# Patient Record
Sex: MALE | Race: WHITE
[De-identification: names, ages, dates, MRNs, and addresses within clinical notes are randomized per-mention and may not be internally consistent; named-entity substitution may affect disease eponyms.]

---

## 2017-05-07 ENCOUNTER — HOSPITAL ENCOUNTER (INPATIENT)
Dept: HOSPITAL 53 - M PCU | Age: 57
LOS: 4 days | Discharge: NURSING, REHAB OR LONG-TERM CARE | DRG: 45 | End: 2017-05-11
Attending: INTERNAL MEDICINE | Admitting: INTERNAL MEDICINE
Payer: SELF-PAY

## 2017-05-07 VITALS — SYSTOLIC BLOOD PRESSURE: 206 MMHG | DIASTOLIC BLOOD PRESSURE: 108 MMHG

## 2017-05-07 VITALS — SYSTOLIC BLOOD PRESSURE: 184 MMHG | DIASTOLIC BLOOD PRESSURE: 98 MMHG

## 2017-05-07 VITALS — BODY MASS INDEX: 29.57 KG/M2 | HEIGHT: 74 IN | WEIGHT: 230.38 LBS

## 2017-05-07 DIAGNOSIS — F17.210: ICD-10-CM

## 2017-05-07 DIAGNOSIS — G89.29: ICD-10-CM

## 2017-05-07 DIAGNOSIS — J30.9: ICD-10-CM

## 2017-05-07 DIAGNOSIS — Z79.891: ICD-10-CM

## 2017-05-07 DIAGNOSIS — E11.9: ICD-10-CM

## 2017-05-07 DIAGNOSIS — Z79.899: ICD-10-CM

## 2017-05-07 DIAGNOSIS — I63.211: Primary | ICD-10-CM

## 2017-05-07 DIAGNOSIS — I10: ICD-10-CM

## 2017-05-07 DIAGNOSIS — E87.6: ICD-10-CM

## 2017-05-07 DIAGNOSIS — G81.94: ICD-10-CM

## 2017-05-07 LAB
ANION GAP SERPL CALC-SCNC: 7 MEQ/L (ref 8–16)
BUN SERPL-MCNC: 9 MG/DL (ref 7–18)
CALCIUM SERPL-MCNC: 8.6 MG/DL (ref 8.5–10.1)
CHLORIDE SERPL-SCNC: 103 MEQ/L (ref 98–107)
CHOLEST SERPL-MCNC: 159 MG/DL (ref ?–200)
CO2 SERPL-SCNC: 27 MEQ/L (ref 21–32)
CREAT SERPL-MCNC: 0.93 MG/DL (ref 0.7–1.3)
ERYTHROCYTE [DISTWIDTH] IN BLOOD BY AUTOMATED COUNT: 13.6 % (ref 11.5–14.5)
EST. AVERAGE GLUCOSE BLD GHB EST-MCNC: 255 MG/DL (ref 60–110)
GFR SERPL CREATININE-BSD FRML MDRD: > 60 ML/MIN/{1.73_M2} (ref 56–?)
GLUCOSE SERPL-MCNC: 262 MG/DL (ref 70–105)
MCH RBC QN AUTO: 31.2 PG (ref 27–33)
MCHC RBC AUTO-ENTMCNC: 34.4 G/DL (ref 32–36.5)
MCV RBC AUTO: 90.7 FL (ref 80–96)
PLATELET # BLD AUTO: 128 K/MM3 (ref 150–450)
POTASSIUM SERPL-SCNC: 3.7 MEQ/L (ref 3.5–5.1)
SODIUM SERPL-SCNC: 137 MEQ/L (ref 136–145)
TRIGL SERPL-MCNC: 192 MG/DL (ref ?–150)
WBC # BLD AUTO: 8.9 K/MM3 (ref 4–10)

## 2017-05-07 RX ADMIN — ATORVASTATIN CALCIUM SCH MG: 20 TABLET, FILM COATED ORAL at 21:00

## 2017-05-07 RX ADMIN — DOCUSATE SODIUM,SENNOSIDES SCH TAB: 50; 8.6 TABLET, FILM COATED ORAL at 21:00

## 2017-05-07 RX ADMIN — HYDRALAZINE HYDROCHLORIDE SCH MG: 20 INJECTION INTRAMUSCULAR; INTRAVENOUS at 22:05

## 2017-05-07 RX ADMIN — INSULIN LISPRO SCH UNITS: 100 INJECTION, SOLUTION INTRAVENOUS; SUBCUTANEOUS at 21:00

## 2017-05-07 NOTE — REPUSA
MRI of the brain

Clinical history: left-sided numbness.

Technique: Multiecho multiplanar MRI images of the brain were obtained without administration of cont
rast. Diffusion weighted images with ADC mapping was also obtained.

Findings:

The ventricles and sulci are symmetric bilaterally. There is a focal area  of T2 hyperintensity demon
strated the posterior right haider. This site demonstrates restricted diffusion, consistent with an acu
te infarct. Mild edema is seen in this region on T2 weighted images. There is no midline shift, mass 
effect, or extra-axial fluid collection. The cervical cranial junction is intact. The orbits are unre
markable. The visualized paranasal sinuses and mastoid air cells are clear. The osseous structures an
d superficial soft tissues are unremarkable. The vascular structures demonstrate appropriate flow voi
ds.

Impression: Acute infarct in the posterior right haider.

Milli, the floor nurse, was notified of these findings at 11:42 PM on 5/7/2017. The physician is 
to be notified.

     Electronically signed by AUGUST DAVIS MD on 05/07/2017 11:43:00 PM ET

## 2017-05-07 NOTE — REPUSA
MRA of the brain

Clinical history: Left sided numbness.

Technique: Time-of-flight MRA images of the brain were obtained without administration of contrast. 3
-D MIP images were also obtained.

Findings: There is focal thrombosis of the midportion of the basilar artery. There is also no signifi
cant flow demonstrated in the right superior cerebellar artery, although flow is seen in the left cer
ebellar artery. Reconstitution of blood flow is seen just proximal to the  The vascular structures ex
tending from the distal carotid  through the [place] demonstrate normal caliber and contour.
 There is no evidence of aneurysm.

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 
in the right superior cerebellar artery.

     Electronically signed by AUGUST DAVIS MD on 05/07/2017 11:35:59 PM ET

## 2017-05-08 VITALS — DIASTOLIC BLOOD PRESSURE: 90 MMHG | SYSTOLIC BLOOD PRESSURE: 175 MMHG

## 2017-05-08 VITALS — DIASTOLIC BLOOD PRESSURE: 98 MMHG | SYSTOLIC BLOOD PRESSURE: 186 MMHG

## 2017-05-08 VITALS — DIASTOLIC BLOOD PRESSURE: 95 MMHG | SYSTOLIC BLOOD PRESSURE: 173 MMHG

## 2017-05-08 VITALS — DIASTOLIC BLOOD PRESSURE: 85 MMHG | SYSTOLIC BLOOD PRESSURE: 178 MMHG

## 2017-05-08 VITALS — SYSTOLIC BLOOD PRESSURE: 174 MMHG | DIASTOLIC BLOOD PRESSURE: 82 MMHG

## 2017-05-08 VITALS — SYSTOLIC BLOOD PRESSURE: 160 MMHG | DIASTOLIC BLOOD PRESSURE: 88 MMHG

## 2017-05-08 VITALS — SYSTOLIC BLOOD PRESSURE: 166 MMHG | DIASTOLIC BLOOD PRESSURE: 84 MMHG

## 2017-05-08 LAB
ANION GAP SERPL CALC-SCNC: 9 MEQ/L (ref 8–16)
BUN SERPL-MCNC: 10 MG/DL (ref 7–18)
CALCIUM SERPL-MCNC: 8.4 MG/DL (ref 8.5–10.1)
CHLORIDE SERPL-SCNC: 104 MEQ/L (ref 98–107)
CO2 SERPL-SCNC: 24 MEQ/L (ref 21–32)
CREAT SERPL-MCNC: 0.81 MG/DL (ref 0.7–1.3)
ERYTHROCYTE [DISTWIDTH] IN BLOOD BY AUTOMATED COUNT: 13.6 % (ref 11.5–14.5)
GFR SERPL CREATININE-BSD FRML MDRD: > 60 ML/MIN/{1.73_M2} (ref 56–?)
GLUCOSE SERPL-MCNC: 220 MG/DL (ref 70–105)
MCH RBC QN AUTO: 31.8 PG (ref 27–33)
MCHC RBC AUTO-ENTMCNC: 35.5 G/DL (ref 32–36.5)
MCV RBC AUTO: 89.5 FL (ref 80–96)
PLATELET # BLD AUTO: 118 K/MM3 (ref 150–450)
POTASSIUM SERPL-SCNC: 3.6 MEQ/L (ref 3.5–5.1)
SODIUM SERPL-SCNC: 137 MEQ/L (ref 136–145)
WBC # BLD AUTO: 9.7 K/MM3 (ref 4–10)

## 2017-05-08 RX ADMIN — INSULIN LISPRO SCH UNITS: 100 INJECTION, SOLUTION INTRAVENOUS; SUBCUTANEOUS at 21:00

## 2017-05-08 RX ADMIN — SODIUM CHLORIDE, PRESERVATIVE FREE SCH ML: 5 INJECTION INTRAVENOUS at 14:00

## 2017-05-08 RX ADMIN — INSULIN LISPRO SCH UNITS: 100 INJECTION, SOLUTION INTRAVENOUS; SUBCUTANEOUS at 09:00

## 2017-05-08 RX ADMIN — INSULIN LISPRO SCH UNITS: 100 INJECTION, SOLUTION INTRAVENOUS; SUBCUTANEOUS at 12:27

## 2017-05-08 RX ADMIN — ENOXAPARIN SODIUM SCH MG: 40 INJECTION SUBCUTANEOUS at 08:59

## 2017-05-08 RX ADMIN — DOCUSATE SODIUM,SENNOSIDES SCH TAB: 50; 8.6 TABLET, FILM COATED ORAL at 21:10

## 2017-05-08 RX ADMIN — HYDRALAZINE HYDROCHLORIDE SCH MG: 20 INJECTION INTRAMUSCULAR; INTRAVENOUS at 02:55

## 2017-05-08 RX ADMIN — HYDRALAZINE HYDROCHLORIDE SCH MG: 20 INJECTION INTRAMUSCULAR; INTRAVENOUS at 21:10

## 2017-05-08 RX ADMIN — HYDRALAZINE HYDROCHLORIDE SCH MG: 20 INJECTION INTRAMUSCULAR; INTRAVENOUS at 08:00

## 2017-05-08 RX ADMIN — NICOTINE SCH PATCH: 21 PATCH, EXTENDED RELEASE TRANSDERMAL at 10:14

## 2017-05-08 RX ADMIN — SODIUM CHLORIDE, PRESERVATIVE FREE SCH ML: 5 INJECTION INTRAVENOUS at 21:13

## 2017-05-08 RX ADMIN — ATORVASTATIN CALCIUM SCH MG: 20 TABLET, FILM COATED ORAL at 21:08

## 2017-05-08 RX ADMIN — DOCUSATE SODIUM,SENNOSIDES SCH TAB: 50; 8.6 TABLET, FILM COATED ORAL at 09:00

## 2017-05-08 RX ADMIN — INSULIN LISPRO SCH UNITS: 100 INJECTION, SOLUTION INTRAVENOUS; SUBCUTANEOUS at 17:34

## 2017-05-08 RX ADMIN — HYDRALAZINE HYDROCHLORIDE SCH MG: 20 INJECTION INTRAMUSCULAR; INTRAVENOUS at 14:00

## 2017-05-08 NOTE — REP
Clinical:  Acute stroke.

 

Technique:  Gray scale and color Doppler evaluation using linear high frequency

transducer

 

Findings:

Two-dimensional gray scale and color images demonstrate intimal thickening to the

bilateral common carotid arteries extending to the carotid bulbs where small

amounts of mixed plaque material is identified causing minimal luminal narrowing.

Normal laminar flow is appreciated without turbulence or significant stenosis.

Color Doppler interrogation demonstrates normal arterial wave patterns and

velocities with no significant spectral broadening.  Normal flow direction is

appreciated in the bilateral vertebral arteries.

 

                                              RIGHT (cm/s)         LEFT (cm/s)

 

ICA peak systolic velocity            90.7                          996.8

ICA diastolic velocity                  37.0                          34.5

ECA peak systolic velocity           162.6                        153.9

CCA peak systolic velocity           122.2                        145.8

ICA/CCA ratio                           0.74                          0.66

 

Impression:

No hemodynamically significant areas of narrowing or stenosis appreciated.

Based on set standards narrowing falls within the less than 50% range.

 

 

Signed by

Beni Reed MD 05/08/2017 03:39 A

## 2017-05-08 NOTE — REP
MR ANGIOGRAPHY OF THE CAROTIDS WITHOUT AND WITH IV GADOLINIUM:

 

HISTORY:  Acute stroke.

 

Comparison is made with the previous day's MRI study would interpreted showing a

right  posterior pontine infarction.

 

TECHNIQUE:  2D pre-gadolinium-enhanced and 3D post-gadolinium enhanced MR

angiography is acquired in the usual fashion.  Maximal intensity projection

images are generated and displayed rotationally.  Source axial and coronal images

are reviewed.  The gadolinium enhancement dose is 25 ml of intravenous ProHance.

 

MR angiographic findings:  Great vessel origins from the thoracic aorta are

unremarkable.  The vertebral arteries are patent and codominant.  There is

however a high-grade stenosis versus short segment occlusion of the basilar

artery.  The right distal vertebral artery is hypoplastic.

 

The common carotid arteries are unremarkable.  No evidence of carotid artery

stenosis is seen on either side.  The internal carotid arteries are unremarkable

bilaterally.

 

IMPRESSION: High-grade focal stenosis versus short segment occlusion of the

basilar artery.

 

 

Signed by

Major Mancia MD 05/08/2017 03:43 P

## 2017-05-08 NOTE — HPE
DATE OF ADMISSION:  05/07/2017

 

PRIMARY CARE PROVIDER:  SCOTT Avilez.

 

CHIEF COMPLAINT:  Patient presented to [facility] with left-sided

tingling, numbness, weakness and lightness for 1 day.

 

PAST MEDICAL HISTORY:  Hypertension, has not taken any medications for at least

10 years.

 

HISTORY OF PRESENT ILLNESS:  This is a 56-year-old male, has not seen any

physician except orthopedic surgeons for the past 10 years and has not had any

blood work done that he can remember for the past 10 years, who has been feeling

sick for 1 week.  He felt off balance, which happened intermittently throughout

the week.  He also had blurring of vision intermittently during the week, then on

one day last week he checked his blood pressure, which was over 200; however, he

rechecked it later, which came down back to normal.  He was also feeling some

abnormal sensations over his hands and legs today.  The tingling and numbness was

all throughout the left side of the body including the face, the upper

extremities and the lower extremities.  He felt his hands and his legs were

lighter and numb and he felt that he was not able to make his hand do his

commands properly.  He thought that he was having a stroke.

 

In the emergency department (ED), on initial arrival, his blood pressure was

noted to be 216/139, pulse was 110, respiratory rate 16, temperature 98.5, pulse

oximetry 99% in room air.  He had a CT scan of the head dome at [facility], which did not show any acute stroke; however, on physical exam, it was

felt his left side was weaker and there was drift on the left upper extremity, so

the concern for stroke remained high, so our hospital was called for transfer as

they did not have MRA capabilities, so the patient was transferred here for

evaluation for stroke.

 

On admission here, initially his blood pressure was 210.  Patient was ordered MRI

and MRA of the brain.  Patient's blood work from [place] was significant for

hemoglobin of 18.4, hematocrit of 50, platelets of 132.  His sodium was 129,

blood sugar was 326.  Liver function tests were normal.  Renal function tests

were normal.  Coagulation studies were normal.  Patient did say that he took

someone else's ramipril on Thursday, Friday and Saturday after he noted his blood

pressure to be high on Wednesday and after taking the ramipril his blood pressure

had improved.  He denied any fever or chills.  Denied any chest pain, any

shortness of breath or cough.  Denied any abdominal pain, nausea, vomiting or

diarrhea.  He continues to have persistent abnormal sensations on the left side

of the body and weakness.

 

PAST SURGICAL HISTORY:  Right knee surgery and left knee surgery about 10 years

ago.

SOCIAL HISTORY: Patient smokes about a pack per day.  Occasionally drinks

alcohol.  Does not abuse any recreational drugs.

 

ALLERGIES:  No known drug allergies.

 

HOME MEDICATIONS:  Does not take any home medications.

 

REVIEW OF SYSTEMS:  All 10-point review of systems was negative except those

mentioned in history of present illness (HPI).

 

PHYSICAL EXAMINATION:

VITAL SIGNS:  Temperature 97.5, pulse 94, respiratory rate 20, blood pressure

206/108, pulse oximetry 94% in room air.

GENERAL:  Patient awake, alert, oriented times three, lying down in bed in no

acute distress.

HEENT:  Normocephalic, atraumatic.  Moist mucous membranes.  Anicteric eyes.

CHEST:  Clear to auscultation.

CARDIOVASCULAR:  S1, S2 regular.  No rub, murmur or gallop.

ABDOMEN:  Soft, nontender, bowel sounds present.

EXTREMITIES:  No edema.

NEUROLOGICAL:  There is 4/5 power in the upper extremity and 4/5 power in the

lower extremity.  There is pronator drift of the upper extremity.  Babinski is

equivocal.  Deep tendon reflexes could not be elicited on either lower extremity.

 

 

LABORATORY DATA:  WBC 8.9, hemoglobin 18, platelets 128.

 

Sodium 137, potassium 3.7, chloride 103, bicarbonate 27, BUN 9, creatinine 0.9,

glucose 262, calcium 8.6.

 

ASSESSMENT AND PLAN:  This is a 56-year-old male admitted for possible stroke.

 

PLAN:

1.  For possible stroke, will get MRI and MRA of the brain without contrast.

Will also get carotid ultrasound bilaterally.  Patient already received aspirin

and statin at [facility].  Will continue that from tomorrow.  Will allow

for permissive hypertension and will not give any antihypertensive medications

unless systolic blood pressure is over 180.

 

2.  Diabetes.  Patient has two tests of over 200 random values, so that qualifies

the patient as diabetic.  Will place the patient on sliding-scale insulin at

present and will later start the patient on oral hypoglycemic medications.

 

3.  Hypertension.  Patient did have some blood pressure readings in that

hospital, as well as initial blood pressure reading in our hospital over 200.

Will place the patient on as needed hydralazine 10 mg intravenously (IV) if

systolic blood pressure is greater than 180.

 

4.  Deep venous thrombosis (DVT) prophylaxis has been ordered.

## 2017-05-08 NOTE — IPNPDOC
Text Note


Date of Service


The patient was seen on 5/8/17.





NOTE


Spoke with Dr Mcdonald in [facility] for possible transfer for thrombectomy . 

However as his NIHSS stroke score is only 4 he is not a candidate for any 

endovascular intervention. It is recommended only is the score is 6 or greater. 

Spoke with Dr Dunn he will be seeing the patient. Will continue with 

statin , asa, maintain sbp between 160 to 180 , neuro checks.





VS,Fishbone, I+O


VS, Fishbone, I+O


Laboratory Tests


5/7/17 21:09








Red Blood Count 5.78, Mean Corpuscular Volume 90.7, Mean Corpuscular Hemoglobin 

31.2, Mean Corpuscular Hemoglobin Concent 34.4, Red Cell Distribution Width 13.6

, Calcium Level 8.6








Vital Signs








  Date Time  Temp Pulse Resp B/P (MAP) Pulse Ox O2 Delivery O2 Flow Rate FiO2


 


5/7/17 23:59 99.6 95 20 184/98 (126) 95 Room Air  














I&O- Last 24 Hours up to 6 AM 


 


 5/8/17





 06:00


 


Intake Total 0 ml


 


Balance 0 ml

















DANY DOOLEY MD May 8, 2017 01:18

## 2017-05-09 VITALS — DIASTOLIC BLOOD PRESSURE: 93 MMHG | SYSTOLIC BLOOD PRESSURE: 183 MMHG

## 2017-05-09 VITALS — DIASTOLIC BLOOD PRESSURE: 96 MMHG | SYSTOLIC BLOOD PRESSURE: 175 MMHG

## 2017-05-09 VITALS — DIASTOLIC BLOOD PRESSURE: 92 MMHG | SYSTOLIC BLOOD PRESSURE: 188 MMHG

## 2017-05-09 VITALS — DIASTOLIC BLOOD PRESSURE: 92 MMHG | SYSTOLIC BLOOD PRESSURE: 174 MMHG

## 2017-05-09 VITALS — DIASTOLIC BLOOD PRESSURE: 82 MMHG | SYSTOLIC BLOOD PRESSURE: 154 MMHG

## 2017-05-09 VITALS — SYSTOLIC BLOOD PRESSURE: 179 MMHG | DIASTOLIC BLOOD PRESSURE: 104 MMHG

## 2017-05-09 VITALS — SYSTOLIC BLOOD PRESSURE: 168 MMHG | DIASTOLIC BLOOD PRESSURE: 86 MMHG

## 2017-05-09 VITALS — DIASTOLIC BLOOD PRESSURE: 92 MMHG | SYSTOLIC BLOOD PRESSURE: 162 MMHG

## 2017-05-09 VITALS — DIASTOLIC BLOOD PRESSURE: 94 MMHG | SYSTOLIC BLOOD PRESSURE: 162 MMHG

## 2017-05-09 LAB
ANION GAP SERPL CALC-SCNC: 11 MEQ/L (ref 8–16)
BASOPHILS # BLD AUTO: 0.1 K/MM3 (ref 0–0.2)
BASOPHILS NFR BLD AUTO: 1 % (ref 0–1)
BUN SERPL-MCNC: 10 MG/DL (ref 7–18)
CALCIUM SERPL-MCNC: 8.2 MG/DL (ref 8.5–10.1)
CHLORIDE SERPL-SCNC: 105 MEQ/L (ref 98–107)
CO2 SERPL-SCNC: 23 MEQ/L (ref 21–32)
CREAT SERPL-MCNC: 0.76 MG/DL (ref 0.7–1.3)
EOSINOPHIL # BLD AUTO: 0.2 K/MM3 (ref 0–0.5)
EOSINOPHIL NFR BLD AUTO: 1.9 % (ref 0–3)
ERYTHROCYTE [DISTWIDTH] IN BLOOD BY AUTOMATED COUNT: 13.7 % (ref 11.5–14.5)
GFR SERPL CREATININE-BSD FRML MDRD: > 60 ML/MIN/{1.73_M2} (ref 56–?)
GLUCOSE SERPL-MCNC: 196 MG/DL (ref 70–105)
LARGE UNSTAINED CELL #: 0.2 K/MM3 (ref 0–0.4)
LARGE UNSTAINED CELL %: 1.5 % (ref 0–4)
LYMPHOCYTES # BLD AUTO: 1.9 K/MM3 (ref 1.5–4.5)
LYMPHOCYTES NFR BLD AUTO: 17.4 % (ref 24–44)
MCH RBC QN AUTO: 30.6 PG (ref 27–33)
MCHC RBC AUTO-ENTMCNC: 34.8 G/DL (ref 32–36.5)
MCV RBC AUTO: 87.8 FL (ref 80–96)
MONOCYTES # BLD AUTO: 0.6 K/MM3 (ref 0–0.8)
MONOCYTES NFR BLD AUTO: 5.7 % (ref 0–5)
NEUTROPHILS # BLD AUTO: 7.1 K/MM3 (ref 1.8–7.7)
NEUTROPHILS NFR BLD AUTO: 72.5 % (ref 36–66)
PLATELET # BLD AUTO: 106 K/MM3 (ref 150–450)
POTASSIUM SERPL-SCNC: 3.3 MEQ/L (ref 3.5–5.1)
SODIUM SERPL-SCNC: 139 MEQ/L (ref 136–145)
WBC # BLD AUTO: 9.8 K/MM3 (ref 4–10)

## 2017-05-09 RX ADMIN — INSULIN LISPRO SCH UNITS: 100 INJECTION, SOLUTION INTRAVENOUS; SUBCUTANEOUS at 11:51

## 2017-05-09 RX ADMIN — INSULIN LISPRO SCH UNITS: 100 INJECTION, SOLUTION INTRAVENOUS; SUBCUTANEOUS at 21:00

## 2017-05-09 RX ADMIN — ATORVASTATIN CALCIUM SCH MG: 20 TABLET, FILM COATED ORAL at 20:28

## 2017-05-09 RX ADMIN — HYDRALAZINE HYDROCHLORIDE SCH MG: 20 INJECTION INTRAMUSCULAR; INTRAVENOUS at 08:00

## 2017-05-09 RX ADMIN — SODIUM CHLORIDE, PRESERVATIVE FREE SCH ML: 5 INJECTION INTRAVENOUS at 13:50

## 2017-05-09 RX ADMIN — DOCUSATE SODIUM,SENNOSIDES SCH TAB: 50; 8.6 TABLET, FILM COATED ORAL at 20:28

## 2017-05-09 RX ADMIN — NICOTINE SCH PATCH: 21 PATCH, EXTENDED RELEASE TRANSDERMAL at 08:36

## 2017-05-09 RX ADMIN — HYDRALAZINE HYDROCHLORIDE SCH MG: 20 INJECTION INTRAMUSCULAR; INTRAVENOUS at 02:17

## 2017-05-09 RX ADMIN — HYDRALAZINE HYDROCHLORIDE SCH MG: 20 INJECTION INTRAMUSCULAR; INTRAVENOUS at 20:28

## 2017-05-09 RX ADMIN — POTASSIUM CHLORIDE SCH MEQ: 750 TABLET, EXTENDED RELEASE ORAL at 11:51

## 2017-05-09 RX ADMIN — SODIUM CHLORIDE, PRESERVATIVE FREE SCH ML: 5 INJECTION INTRAVENOUS at 21:14

## 2017-05-09 RX ADMIN — HYDRALAZINE HYDROCHLORIDE SCH MG: 20 INJECTION INTRAMUSCULAR; INTRAVENOUS at 13:50

## 2017-05-09 RX ADMIN — ENOXAPARIN SODIUM SCH MG: 40 INJECTION SUBCUTANEOUS at 08:34

## 2017-05-09 RX ADMIN — POTASSIUM CHLORIDE SCH MEQ: 750 TABLET, EXTENDED RELEASE ORAL at 08:35

## 2017-05-09 RX ADMIN — INSULIN LISPRO SCH UNITS: 100 INJECTION, SOLUTION INTRAVENOUS; SUBCUTANEOUS at 08:35

## 2017-05-09 RX ADMIN — SODIUM CHLORIDE, PRESERVATIVE FREE SCH ML: 5 INJECTION INTRAVENOUS at 05:29

## 2017-05-09 RX ADMIN — DOCUSATE SODIUM,SENNOSIDES SCH TAB: 50; 8.6 TABLET, FILM COATED ORAL at 08:35

## 2017-05-09 RX ADMIN — ASPIRIN SCH MG: 81 TABLET ORAL at 08:36

## 2017-05-09 RX ADMIN — INSULIN LISPRO SCH UNITS: 100 INJECTION, SOLUTION INTRAVENOUS; SUBCUTANEOUS at 17:32

## 2017-05-09 NOTE — REPUSA
CLINICAL HISTORY: Headache.

TECHNIQUE: Multiple axial CT images were obtained through the brain without IV contrast material.

COMMENTS: 

There is normal configuration of sella turcica. There are no intra or extra-axial collections. There 
is no mass effect or midline shift. There is no evidence of hematoma formation. No hydrocephalus is p
resent. The ventricles are symmetrical. No abnormal calcifications are present. 

There are bilateral periventricular and subcortical white matter hypolucencies compatible with mild c
hronic microvascular disease.

Otherwise, no significant focal abnormalities are seen either in the posterior fossa or supratentoria
l compartment.

IMPRESSION:

1.  Mild chronic microvascular disease.

2. No evidence of acute intracranial pathology. 

Thank you for your kind referral of this patient.

 

     Electronically signed by JENNIFER NINO MD on 05/09/2017 06:17:59 AM ET

## 2017-05-09 NOTE — CR
DATE OF CONSULTATION:  05/09/2017

 

REASON FOR CONSULTATION:  Suspect stroke.

 

HISTORY OF PRESENT ILLNESS:  Ramnó Rosales is a 56-year-old male with a past

medical history significant for untreated diabetes, tobacco abuse presenting with

a chief complaint of experiencing intermittent symptoms of dizziness, off balance

feeling, blurring of vision intermittently for the week.  He suddenly developed

left upper and lower extremity paresthesias and tingling which persisted.  He was

seen at [facility] and he was thought to have possible stroke.  Head CT

was negative.  The patient was later transferred to [facility] for

further evaluation.  By then, the patient is out of the TPA window.  The patient

was found to have evidence of a right posterior pontine infarct, as well as

evidence of a vascular severe stenosis versus occlusion with right atrial artery

occlusion.  The patient was started on aspirin 81 mg daily.  This was switched

over to aspirin 81 mg and Plavix 75 mg daily.  The patient states that he is

ready to quit tobacco.  He states that he continues to have paresthesias of his

left arm and leg and trunk.  He has weakness and clumsiness of his left arm and

of his left leg.  He has a positive pronator drift upon assessment.  He denies

any headache.  He denies any change in vision or speech.  He denies any

dysarthria.

 

PAST MEDICAL HISTORY:  Tobacco abuse, untreated diabetes, new diagnosis for the

patient.

 

PAST SURGICAL HISTORY:  Right knee surgery and left knee surgery about 10 years

ago.

 

SOCIAL HISTORY:  The patient smokes one to two packs of tobacco per day and

drinks alcohol recreationally.  Denies use of any recreational drugs.

 

ALLERGIES:  None.

 

MEDICATIONS:  None.

 

REVIEW OF SYSTEMS:  14-point review of systems obtained and is negative except as

per history of present illness.

 

PHYSICAL EXAMINATION

Blood pressure is 206/108 on admission, pulse rate 94, respiratory rate 20,

oxygenation 94% on room air.  The patient is alert, oriented to person, place and

time.  Speech language, ____________________ repetition are intact.  Pupils are 3

mm round, reactive to light.  No nystagmus is noted.  V1, V2-V3 is intact to

light touch.  Hearing is subjectively equal to finger rub and tongue is midline.

Palate elevates symmetrically.  No weakness sternocleidomastoids bilaterally.

Motor examination:  There is positive pronator drift of the left upper extremity.

Deltoid is a 5-/5, otherwise 5/5 throughout.  The lower extremity iliopsoas on

the left is a 4+, otherwise 5/5 throughout.  Deep tendon reflexes are slightly

increased in the left upper extremity compared to the right.  Deep tendon

reflexes are absent at the patellas and Achilles.  Babinski signs are mute.

Sensory is in decreased to light touch in the left upper and lower extremity.

Coordination reveals mild dysmetria with finger-to-nose of the left upper

extremity.  Gait deferred.

 

ASSESSMENT:

1.  Posterior right pontine acute infarct with severe stenosis of deep vessel

artery versus occlusion, as well as occlusion of the right vertebral artery under

the setting of tobacco abuse and undiagnosed diabetes.

 

PLAN

1.  Optimize diabetes.

2.  Continue aspirin 81 mg, Plavix 75 mg.

3.  Continue Lipitor 40 mg.

4.  Continue telemetry monitoring.  Followup echocardiogram.

5.  Physical therapy (PT) and occupational therapy (OT) evaluation.

6.  Keep systolic blood pressure 140 to 180 for 72 hours and then normalize.

7.  Recommend tobacco cessation.

## 2017-05-09 NOTE — ECHO
DATE OF PROCEDURE:  05/07/2017

 

AGE: 56

GENDER: Male

REFERRING PHYSICIAN: Dr. Sheridan Garcia.

HEIGHT: 74 inches.

WEIGHT: 233 pounds.

BODY SURFACE AREA: 2.32 sq m.

INPATIENT:  PCU Room 3221.

 

INDICATION:  Cerebrovascular accident (CVA, question cardiac source).

 

MEASUREMENTS:

 

2D MEASUREMENTS:

RV - 3.9 cm

LV- 4.8 cm

Septum - 1.3 cm

Posterior wall - 1.3 cm

Aortic root - 3.7 cm

LA - 4.2 cm

LVEF - 65%

 

DOPPLER MEASUREMENTS:

AV - 1.1 m/s

LVOT - 0.95 m/s

LVOT diameter - 2.7 cm

MV-E: 73  A: 110  EA ratio 0.6

Early mitral deacceleration time 155 ms

E-prime - 6.1

A-prime - 13

E/E prime ratio 11.8

PV - 0.9 m/s

Pulmonary artery acceleration time 81 ms

PASP - 43 mmHg

IVC - 1.8 cm

 

COMMENTS: Normal sinus rhythm without significant intraventricular conduction

disturbance.  Technically difficult study in light of the patient's body habitus

but diagnostically useful information was still obtained.

 

Mildly dilated left atrium but normal left ventricular size.  Right heart chamber

sizes were normal.  LV wall thickness was at least mildly hypertrophied

symmetrically.  On real-time imaging from the parasternal and apical projections,

wall motion was symmetrical and normal to hyperkinetic.  Slightly thickened

mitral annulus but normal leaflet thickness and excursion with no posterior

systolic buckling.  Three equal size aortic cusps with marginally thickened cusp

edges but adequate cusp separation.  Normal aortic root size.  No apparent

intracardiac mass or pericardial effusion.

 

Color flow Doppler study taken from the parasternal and apical projection showed

trace tricuspid but no mitral or aortic insufficiency.

 

Guided continuous wave Doppler of his aortic valve showed a normal peak systolic

velocity against LV outflow tract obstruction.

 

Pulsed and continuous wave Doppler of his LV inflow tract taken from the apical

four-chamber projection showed normal diastolic filling velocities against mitral

stenosis.  There was more prominent late diastolic/atrial dependent filling

pattern.  A degree of diastolic dysfunction was confirmed using tissue Doppler of

his mitral annulus but his current estimated mean left atrial pressure was upper

limits of normal at approximately 15 mmHg.

 

Pulsed and continuous wave Doppler of his pulmonary trunk showed a normal peak

systolic velocity against RV outflow tract obstruction.  His pulmonary artery

acceleration time was abbreviated suggestive of an elevated pulmonary vascular

resistance and perhaps moderate pulmonary hypertension.

 

We attempted to further estimate his right ventricular systolic pressure using

guided continuous wave Doppler of his tricuspid valve but could not get a clear

spectral envelope.  His inferior vena cava was of normal size with normal

respiratory collapse against an elevated central venous pressure.

 

CONCLUSIONS:

Technically difficult study.  Unable to identify a potential intracardiac source

of his embolic phenomenon.

Mild concentric left ventricle hypertrophy with normal wall motion.

Mildly dilated left atrium with Doppler evidence of an impairment of LV diastolic

function and estimated mean left atrial pressure upper limits of normal to mildly

increased.

Normal right heart chamber sizes but Doppler sign of perhaps mild to moderate

pulmonary hypertension.

Normal IVC size and collapse against an elevated central venous pressure.

Subtle aortic valvular sclerosis and mitral annular thickening.  If a cardiac

source is seriously suspect in this patient, we would recommend a transesophageal

echocardiogram for better visualization.

## 2017-05-09 NOTE — IPNPDOC
Date Seen


The patient was seen on 5/9/17.





Progress Note


SUBJECTIVE: Mr. Rosales is a 56-year-old  male who is evaluated bedside 

this morning. He reports to me that he feels as though his numbness and 

tingling is getting worse. Reported headache overnight and his CT of the head 

was obtained. It reported no acute changes. Evaluated by physical therapy and 

speech therapy, and occupational therapy.





OBJECTIVE


PHYSICAL EXAMINATION:


VITAL SIGNS: Please see below. 


GENERAL: Pleasant  male sitting on the edge of the bed upon evaluation 

this morning, well-nourished, well-developed, no apparent distress


HEENT: Atraumatic, normocephalic, PERRL, EOMI, lateral nystagmus noted in the 

right eye, no facial asymmetry appreciated


CARDIOVASCULAR: Regular rate and rhythm, normal S1 and S2, no murmur, rub, 

click appreciated


RESPIRATORY: Clear to auscultation bilaterally, adequate inspiratory next for 

airway excursion, no wheeze, rhonchi, crackles


ABDOMINAL: Soft, nontender, nondistended, bowel sounds appreciated


EXTREMITIES: No edema appreciated in bilateral lower extremities; lower 

extremity muscle strength 5/5; sensation still diminished on the entire left 

side of the body; left upper extremity muscle strength 4/5; right upper 

extremity muscle strength 5/5; upper and lower extremity reflexes unobtainable 

even with multiple attempts; skin warm, dry, intact; no atrophy appreciated


NEUROLOGICAL: Sensation to the entire left side of the body is diminished; 

appropriate shoulder shrug, no tongue deviation, no facial asymmetry, lateral 

nystagmus noted on the right


PSYCHOLOGICAL: Mood and affect appear appropriate





LABORATORY DATA: Please see below.





MICROBIOLOGY: Please see below.





IMAGING:


CT of the head without contrast


IMPRESSION:


1.  Mild chronic microvascular disease.


2. No evidence of acute intracranial pathology. 





Echocardiogram:


CONCLUSIONS:


Technically difficult study.  Unable to identify a potential intracardiac 

source of his embolic phenomenon.


Mild concentric left ventricle hypertrophy with normal wall motion.


Mildly dilated left atrium with Doppler evidence of an impairment of LV 

diastolic function and estimated mean left atrial pressure upper limits of 

normal to mildly increased.


Normal right heart chamber sizes but Doppler sign of perhaps mild to moderate 

pulmonary hypertension.


Normal IVC size and collapse against an elevated central venous pressure.


Subtle aortic valvular sclerosis and mitral annular thickening.  If a cardiac 

source is seriously suspect in this patient, we would recommend a 

transesophageal echocardiogram for better visualization.





DVT prophylaxis ordered?: Aspirin 81 mg and Plavix 75 mg daily





ASSESSMENT AND PLAN: Mr. Rosales is a 56-year-old  male who presents 

with cerebrovascular accident and new onset diabetes.





PROBLEMS:


1. Cerebrovascular accident, basilar artery: Continue neurology's input. 

Continue with permissive hypertension for the next 48 hours. Continue with 

aspirin and Plavix. Transthoracic echocardiogram was not revealing as a source 

for emboli. Cardiac source less likely as an etiology. Patient has significant 

tobacco use history. Physical therapy, occupational therapy, and speech therapy 

consulted.





2. Diabetes mellitus, new onset: Continue with sliding scale insulin and 

hypoglycemic protocol.





3. Hypokalemia: Likely result of sliding scale insulin. Supplementation 

provided.





2. Chronic pain: Patient does take [place] outpatient. We'll hold this medication 

for the time being.





3. Allergies: Continue with diphenhydramine.





DISPOSITION: Currently admitted to the progressive care unit. Continue with 

physical therapy and occupational therapy. Potential transfer to rehabilitation 

services in the next 48-72 hours.





VS, I&O, 24H, Fishbone


Vital Signs/I&O





Vital Signs








  Date Time  Temp Pulse Resp B/P (MAP) Pulse Ox O2 Delivery O2 Flow Rate FiO2


 


5/9/17 13:50    175/96    


 


5/9/17 13:40  105      


 


5/9/17 11:46 96.7  20  96   


 


5/9/17 08:00      Room Air  














I&O- Last 24 Hours up to 6 AM 


 


 5/9/17





 06:00


 


Intake Total 600 ml


 


Output Total 1100 ml


 


Balance -500 ml











Laboratory Data


24H LABS


Laboratory Tests 2


5/8/17 16:55: Bedside Glucose (Misc Panel) 213H


5/8/17 19:23: Bedside Glucose (Misc Panel) 221H


5/9/17 05:33: 


White Blood Count 9.8, Red Blood Count 5.65, Hemoglobin 17.3, Hematocrit 49.6, 

Mean Corpuscular Volume 87.8, Mean Corpuscular Hemoglobin 30.6, Mean 

Corpuscular Hemoglobin Concent 34.8, Red Cell Distribution Width 13.7, Platelet 

Count 106L, Neutrophils (%) (Auto) 72.5H, Lymphocytes (%) (Auto) 17.4L, 

Monocytes (%) (Auto) 5.7H, Eosinophils (%) (Auto) 1.9, Basophils (%) (Auto) 1.0

, Neutrophils # (Auto) 7.1, Lymphocytes # (Auto) 1.9, Monocytes # (Auto) 0.6, 

Eosinophils # (Auto) 0.2, Basophils # (Auto) 0.1, Large Unclassified Cells % 1.5

, Large Unclassified Cells # 0.2, Anion Gap 11, Glomerular Filtration Rate > 

60.0, Blood Urea Nitrogen 10, Creatinine 0.76, Sodium Level 139, Potassium 

Level 3.3L, Chloride Level 105, Carbon Dioxide Level 23, Calcium Level 8.2L


5/9/17 11:32: Bedside Glucose (Misc Panel) 183H


CBC/BMP


Laboratory Tests


5/9/17 05:33








Red Blood Count 5.65, Mean Corpuscular Volume 87.8, Mean Corpuscular Hemoglobin 

30.6, Mean Corpuscular Hemoglobin Concent 34.8, Red Cell Distribution Width 13.7

, Neutrophils (%) (Auto) 72.5 H, Lymphocytes (%) (Auto) 17.4 L, Monocytes (%) (

Auto) 5.7 H, Eosinophils (%) (Auto) 1.9, Basophils (%) (Auto) 1.0, Neutrophils 

# (Auto) 7.1, Lymphocytes # (Auto) 1.9, Monocytes # (Auto) 0.6, Eosinophils # (

Auto) 0.2, Basophils # (Auto) 0.1, Calcium Level 8.2 L











DURGA MCFARLAND-KEYANNA May 9, 2017 14:51

## 2017-05-10 VITALS — SYSTOLIC BLOOD PRESSURE: 174 MMHG | DIASTOLIC BLOOD PRESSURE: 87 MMHG

## 2017-05-10 VITALS — DIASTOLIC BLOOD PRESSURE: 84 MMHG | SYSTOLIC BLOOD PRESSURE: 149 MMHG

## 2017-05-10 VITALS — DIASTOLIC BLOOD PRESSURE: 78 MMHG | SYSTOLIC BLOOD PRESSURE: 170 MMHG

## 2017-05-10 VITALS — SYSTOLIC BLOOD PRESSURE: 168 MMHG | DIASTOLIC BLOOD PRESSURE: 88 MMHG

## 2017-05-10 VITALS — DIASTOLIC BLOOD PRESSURE: 67 MMHG | SYSTOLIC BLOOD PRESSURE: 140 MMHG

## 2017-05-10 VITALS — DIASTOLIC BLOOD PRESSURE: 110 MMHG | SYSTOLIC BLOOD PRESSURE: 160 MMHG

## 2017-05-10 VITALS — DIASTOLIC BLOOD PRESSURE: 92 MMHG | SYSTOLIC BLOOD PRESSURE: 160 MMHG

## 2017-05-10 LAB
ANION GAP SERPL CALC-SCNC: 11 MEQ/L (ref 8–16)
BASOPHILS # BLD AUTO: 0.1 K/MM3 (ref 0–0.2)
BASOPHILS NFR BLD AUTO: 0.8 % (ref 0–1)
BUN SERPL-MCNC: 14 MG/DL (ref 7–18)
CALCIUM SERPL-MCNC: 8.9 MG/DL (ref 8.5–10.1)
CHLORIDE SERPL-SCNC: 109 MEQ/L (ref 98–107)
CO2 SERPL-SCNC: 21 MEQ/L (ref 21–32)
CREAT SERPL-MCNC: 0.75 MG/DL (ref 0.7–1.3)
EOSINOPHIL # BLD AUTO: 0.2 K/MM3 (ref 0–0.5)
EOSINOPHIL NFR BLD AUTO: 1.7 % (ref 0–3)
ERYTHROCYTE [DISTWIDTH] IN BLOOD BY AUTOMATED COUNT: 13.8 % (ref 11.5–14.5)
GFR SERPL CREATININE-BSD FRML MDRD: > 60 ML/MIN/{1.73_M2} (ref 56–?)
GLUCOSE SERPL-MCNC: 196 MG/DL (ref 70–105)
LARGE UNSTAINED CELL #: 0.2 K/MM3 (ref 0–0.4)
LARGE UNSTAINED CELL %: 1.7 % (ref 0–4)
LYMPHOCYTES # BLD AUTO: 1.6 K/MM3 (ref 1.5–4.5)
LYMPHOCYTES NFR BLD AUTO: 18 % (ref 24–44)
MCH RBC QN AUTO: 31.2 PG (ref 27–33)
MCHC RBC AUTO-ENTMCNC: 34.6 G/DL (ref 32–36.5)
MCV RBC AUTO: 90.2 FL (ref 80–96)
MONOCYTES # BLD AUTO: 0.7 K/MM3 (ref 0–0.8)
MONOCYTES NFR BLD AUTO: 7.5 % (ref 0–5)
NEUTROPHILS # BLD AUTO: 6.3 K/MM3 (ref 1.8–7.7)
NEUTROPHILS NFR BLD AUTO: 70.5 % (ref 36–66)
PLATELET # BLD AUTO: 133 K/MM3 (ref 150–450)
POTASSIUM SERPL-SCNC: 3.5 MEQ/L (ref 3.5–5.1)
PROT C AG ACT/NOR PPP IA: 97 % (ref 60–150)
PROT S AG ACT/NOR PPP IA: 92 % (ref 60–150)
PROT S FREE AG ACT/NOR PPP IA: 90 % (ref 57–157)
SODIUM SERPL-SCNC: 141 MEQ/L (ref 136–145)
WBC # BLD AUTO: 9 K/MM3 (ref 4–10)

## 2017-05-10 RX ADMIN — ASPIRIN SCH MG: 81 TABLET ORAL at 08:26

## 2017-05-10 RX ADMIN — NICOTINE SCH PATCH: 21 PATCH, EXTENDED RELEASE TRANSDERMAL at 08:26

## 2017-05-10 RX ADMIN — HYDRALAZINE HYDROCHLORIDE SCH MG: 20 INJECTION INTRAMUSCULAR; INTRAVENOUS at 20:00

## 2017-05-10 RX ADMIN — DOCUSATE SODIUM,SENNOSIDES SCH TAB: 50; 8.6 TABLET, FILM COATED ORAL at 08:32

## 2017-05-10 RX ADMIN — HYDRALAZINE HYDROCHLORIDE SCH MG: 20 INJECTION INTRAMUSCULAR; INTRAVENOUS at 14:00

## 2017-05-10 RX ADMIN — HYDRALAZINE HYDROCHLORIDE SCH MG: 20 INJECTION INTRAMUSCULAR; INTRAVENOUS at 08:26

## 2017-05-10 RX ADMIN — INSULIN LISPRO SCH UNITS: 100 INJECTION, SOLUTION INTRAVENOUS; SUBCUTANEOUS at 08:25

## 2017-05-10 RX ADMIN — SODIUM CHLORIDE, PRESERVATIVE FREE SCH ML: 5 INJECTION INTRAVENOUS at 14:30

## 2017-05-10 RX ADMIN — INSULIN LISPRO SCH UNITS: 100 INJECTION, SOLUTION INTRAVENOUS; SUBCUTANEOUS at 12:20

## 2017-05-10 RX ADMIN — DOCUSATE SODIUM,SENNOSIDES SCH TAB: 50; 8.6 TABLET, FILM COATED ORAL at 08:26

## 2017-05-10 RX ADMIN — DOCUSATE SODIUM,SENNOSIDES SCH TAB: 50; 8.6 TABLET, FILM COATED ORAL at 21:00

## 2017-05-10 RX ADMIN — SODIUM CHLORIDE, PRESERVATIVE FREE SCH ML: 5 INJECTION INTRAVENOUS at 21:34

## 2017-05-10 RX ADMIN — SODIUM CHLORIDE, PRESERVATIVE FREE SCH ML: 5 INJECTION INTRAVENOUS at 05:34

## 2017-05-10 RX ADMIN — ENOXAPARIN SODIUM SCH MG: 40 INJECTION SUBCUTANEOUS at 08:27

## 2017-05-10 RX ADMIN — INSULIN LISPRO SCH UNITS: 100 INJECTION, SOLUTION INTRAVENOUS; SUBCUTANEOUS at 21:00

## 2017-05-10 RX ADMIN — HYDRALAZINE HYDROCHLORIDE SCH MG: 20 INJECTION INTRAMUSCULAR; INTRAVENOUS at 01:09

## 2017-05-10 RX ADMIN — ATORVASTATIN CALCIUM SCH MG: 20 TABLET, FILM COATED ORAL at 21:34

## 2017-05-10 RX ADMIN — CLOPIDOGREL BISULFATE SCH MG: 75 TABLET ORAL at 08:26

## 2017-05-10 RX ADMIN — INSULIN LISPRO SCH UNITS: 100 INJECTION, SOLUTION INTRAVENOUS; SUBCUTANEOUS at 17:16

## 2017-05-10 NOTE — IPNPDOC
Date Seen


The patient was seen on 5/10/17.





Progress Note


SUBJECTIVE: Mr. Rosales is a 56 year old  male evaluated at bedside 

this morning.  He is sitting chairside upon evaluation.  Reports that strength 

and sensation continue to improve.  Continues to work with PT and OT.  Denies 

chest pain, shortness of breath.  Had a mechanical fall yesterday without LOC 

or hitting his head.  Reports that it was secondary to his altered sensation on 

his left side.  States that he drink EtOH occasionally.





OBJECTIVE


PHYSICAL EXAMINATION:


VITAL SIGNS: Please see below. 


GENERAL: Well nourished, well developed  male evaluated at chairside 

this morning, no apparent distress


HEENT: Atraumatic, normocephalic, PERRL, EOMI, right sided lateral nystagmus, 

altered sensation to left side of face, no asymmetry


CARDIOVASCULAR: Regular rate and rhythm, normal S1 and S2, no murmur, rub, 

click appreciated


RESPIRATORY: Clear to auscultation bilaterally, adequate inspiratory and 

expiratory airway excursion, no wheeze, rhonchi, crackles


ABDOMINAL: Soft, flat, non-tender, non-distended, bowel sounds appreciated


EXTREMITIES: LE muscle strength 5/5, sensation improving, but still altered; 

peripheral pulses appreciated in UE and LE b/l, equal, symmetrical, +2/4; finger

-to-nose on the right intact, altered on the left, but improving; UE muscle 

strength 5/5, sensation improving


NEUROLOGICAL: Sensation altered to entire left side, but improving; no facial 

asymmetry; no muscle weakness


PSYCHOLOGICAL: Mood and affect appear appropriate





LABORATORY DATA: Please see below.





MICROBIOLOGY: Please see below.





DVT prophylaxis ordered?: Aspirin 81 mg and Plavix 75 mg daily





ASSESSMENT AND PLAN: Mr. Rosales is a 56-year-old  male who presents 

with cerebrovascular accident and new onset diabetes.





PROBLEMS:


1. Cerebrovascular accident, basilar artery with left-sided weakness: Left-

sided weakness has been present since admission and has been improving.  

Started carvedilol to control BP and tachycardia.  Continue with aspirin and 

Plavix.  Continue with physical therapy and occupational therapy.





2. Tachycardia:  Patient reports being active with PT and OT exercises.  Have 

started carvedilol 6.25mg BID.





3. Diabetes mellitus, new onset: Continue with sliding scale insulin and 

hypoglycemic protocol.





2. Chronic pain: Patient does take [place] outpatient. We'll hold this medication 

for the time being.





3. Allergies: Continue with diphenhydramine.





DISPOSITION: Currently admitted to the progressive care unit. Continue with 

physical therapy and occupational therapy. Potential transfer to rehabilitation 

services in the next 24-48 hours.





VS, I&O, 24H, Fishbone


Vital Signs/I&O





Vital Signs








  Date Time  Temp Pulse Resp B/P (MAP) Pulse Ox O2 Delivery O2 Flow Rate FiO2


 


5/10/17 08:44  115  160/92 (114)    


 


5/10/17 08:00 97.5  18  95 Room Air  














I&O- Last 24 Hours up to 6 AM 


 


 5/10/17





 06:00


 


Intake Total 360 ml


 


Output Total 1400 ml


 


Balance -1040 ml











Laboratory Data


24H LABS


Laboratory Tests 2


5/9/17 16:30: Bedside Glucose (Misc Panel) 196H


5/9/17 21:11: Bedside Glucose (Misc Panel) 189H


5/10/17 05:30: 


White Blood Count 9.0, Red Blood Count 5.73, Hemoglobin 17.9, Hematocrit 51.7, 

Mean Corpuscular Volume 90.2, Mean Corpuscular Hemoglobin 31.2, Mean 

Corpuscular Hemoglobin Concent 34.6, Red Cell Distribution Width 13.8, Platelet 

Count 133L, Neutrophils (%) (Auto) 70.5H, Lymphocytes (%) (Auto) 18.0L, 

Monocytes (%) (Auto) 7.5H, Eosinophils (%) (Auto) 1.7, Basophils (%) (Auto) 0.8

, Neutrophils # (Auto) 6.3, Lymphocytes # (Auto) 1.6, Monocytes # (Auto) 0.7, 

Eosinophils # (Auto) 0.2, Basophils # (Auto) 0.1, Large Unclassified Cells % 1.7

, Large Unclassified Cells # 0.2, Anion Gap 11, Glomerular Filtration Rate > 

60.0, Blood Urea Nitrogen 14, Creatinine 0.75, Sodium Level 141, Potassium 

Level 3.5, Chloride Level 109H, Carbon Dioxide Level 21, Calcium Level 8.9


CBC/BMP


Laboratory Tests


5/10/17 05:30








Red Blood Count 5.73, Mean Corpuscular Volume 90.2, Mean Corpuscular Hemoglobin 

31.2, Mean Corpuscular Hemoglobin Concent 34.6, Red Cell Distribution Width 13.8

, Neutrophils (%) (Auto) 70.5 H, Lymphocytes (%) (Auto) 18.0 L, Monocytes (%) (

Auto) 7.5 H, Eosinophils (%) (Auto) 1.7, Basophils (%) (Auto) 0.8, Neutrophils 

# (Auto) 6.3, Lymphocytes # (Auto) 1.6, Monocytes # (Auto) 0.7, Eosinophils # (

Auto) 0.2, Basophils # (Auto) 0.1, Calcium Level 8.9











DURGA MCFARLAND-I May 10, 2017 11:47

## 2017-05-11 ENCOUNTER — HOSPITAL ENCOUNTER (INPATIENT)
Dept: HOSPITAL 53 - M PM&R | Age: 57
LOS: 12 days | Discharge: HOME | DRG: 58 | End: 2017-05-23
Attending: PHYSICAL MEDICINE & REHABILITATION | Admitting: PHYSICAL MEDICINE & REHABILITATION
Payer: MEDICAID

## 2017-05-11 VITALS — SYSTOLIC BLOOD PRESSURE: 166 MMHG | DIASTOLIC BLOOD PRESSURE: 91 MMHG

## 2017-05-11 VITALS — SYSTOLIC BLOOD PRESSURE: 160 MMHG | DIASTOLIC BLOOD PRESSURE: 84 MMHG

## 2017-05-11 VITALS — WEIGHT: 236.56 LBS | BODY MASS INDEX: 30.36 KG/M2 | HEIGHT: 74 IN

## 2017-05-11 VITALS — DIASTOLIC BLOOD PRESSURE: 74 MMHG | SYSTOLIC BLOOD PRESSURE: 144 MMHG

## 2017-05-11 VITALS — SYSTOLIC BLOOD PRESSURE: 158 MMHG | DIASTOLIC BLOOD PRESSURE: 84 MMHG

## 2017-05-11 VITALS — SYSTOLIC BLOOD PRESSURE: 142 MMHG | DIASTOLIC BLOOD PRESSURE: 88 MMHG

## 2017-05-11 VITALS — SYSTOLIC BLOOD PRESSURE: 155 MMHG | DIASTOLIC BLOOD PRESSURE: 75 MMHG

## 2017-05-11 DIAGNOSIS — Z79.899: ICD-10-CM

## 2017-05-11 DIAGNOSIS — M19.90: ICD-10-CM

## 2017-05-11 DIAGNOSIS — I25.10: ICD-10-CM

## 2017-05-11 DIAGNOSIS — E78.5: ICD-10-CM

## 2017-05-11 DIAGNOSIS — Z79.82: ICD-10-CM

## 2017-05-11 DIAGNOSIS — E11.9: ICD-10-CM

## 2017-05-11 DIAGNOSIS — I10: ICD-10-CM

## 2017-05-11 DIAGNOSIS — D64.9: ICD-10-CM

## 2017-05-11 DIAGNOSIS — Z79.4: ICD-10-CM

## 2017-05-11 DIAGNOSIS — I69.354: Primary | ICD-10-CM

## 2017-05-11 DIAGNOSIS — Z79.02: ICD-10-CM

## 2017-05-11 DIAGNOSIS — F17.210: ICD-10-CM

## 2017-05-11 LAB
ANION GAP SERPL CALC-SCNC: 7 MEQ/L (ref 8–16)
BASOPHILS # BLD AUTO: 0 K/MM3 (ref 0–0.2)
BASOPHILS NFR BLD AUTO: 0.6 % (ref 0–1)
BUN SERPL-MCNC: 15 MG/DL (ref 7–18)
CALCIUM SERPL-MCNC: 8.8 MG/DL (ref 8.5–10.1)
CHLORIDE SERPL-SCNC: 107 MEQ/L (ref 98–107)
CO2 SERPL-SCNC: 27 MEQ/L (ref 21–32)
CREAT SERPL-MCNC: 0.87 MG/DL (ref 0.7–1.3)
EOSINOPHIL # BLD AUTO: 0.2 K/MM3 (ref 0–0.5)
EOSINOPHIL NFR BLD AUTO: 2.5 % (ref 0–3)
ERYTHROCYTE [DISTWIDTH] IN BLOOD BY AUTOMATED COUNT: 13.9 % (ref 11.5–14.5)
GFR SERPL CREATININE-BSD FRML MDRD: > 60 ML/MIN/{1.73_M2} (ref 56–?)
GLUCOSE SERPL-MCNC: 194 MG/DL (ref 70–105)
LARGE UNSTAINED CELL #: 0.2 K/MM3 (ref 0–0.4)
LARGE UNSTAINED CELL %: 1.8 % (ref 0–4)
LYMPHOCYTES # BLD AUTO: 2 K/MM3 (ref 1.5–4.5)
LYMPHOCYTES NFR BLD AUTO: 23.4 % (ref 24–44)
MCH RBC QN AUTO: 31.5 PG (ref 27–33)
MCHC RBC AUTO-ENTMCNC: 34.1 G/DL (ref 32–36.5)
MCV RBC AUTO: 92.1 FL (ref 80–96)
MONOCYTES # BLD AUTO: 0.5 K/MM3 (ref 0–0.8)
MONOCYTES NFR BLD AUTO: 5.8 % (ref 0–5)
NEUTROPHILS # BLD AUTO: 5.6 K/MM3 (ref 1.8–7.7)
NEUTROPHILS NFR BLD AUTO: 65.8 % (ref 36–66)
PLATELET # BLD AUTO: 101 K/MM3 (ref 150–450)
POTASSIUM SERPL-SCNC: 3.7 MEQ/L (ref 3.5–5.1)
SODIUM SERPL-SCNC: 141 MEQ/L (ref 136–145)
WBC # BLD AUTO: 8.5 K/MM3 (ref 4–10)

## 2017-05-11 RX ADMIN — HYDRALAZINE HYDROCHLORIDE SCH MG: 20 INJECTION INTRAMUSCULAR; INTRAVENOUS at 13:38

## 2017-05-11 RX ADMIN — INSULIN LISPRO SCH UNITS: 100 INJECTION, SOLUTION INTRAVENOUS; SUBCUTANEOUS at 09:31

## 2017-05-11 RX ADMIN — ENOXAPARIN SODIUM SCH MG: 30 INJECTION SUBCUTANEOUS at 20:47

## 2017-05-11 RX ADMIN — SODIUM CHLORIDE, PRESERVATIVE FREE SCH ML: 5 INJECTION INTRAVENOUS at 06:37

## 2017-05-11 RX ADMIN — SENNOSIDES SCH TAB: 8.6 TABLET, FILM COATED ORAL at 20:48

## 2017-05-11 RX ADMIN — NICOTINE SCH PATCH: 21 PATCH, EXTENDED RELEASE TRANSDERMAL at 09:32

## 2017-05-11 RX ADMIN — INSULIN LISPRO SCH UNITS: 100 INJECTION, SOLUTION INTRAVENOUS; SUBCUTANEOUS at 20:48

## 2017-05-11 RX ADMIN — HYDRALAZINE HYDROCHLORIDE SCH MG: 20 INJECTION INTRAMUSCULAR; INTRAVENOUS at 02:00

## 2017-05-11 RX ADMIN — HYDRALAZINE HYDROCHLORIDE SCH MG: 20 INJECTION INTRAMUSCULAR; INTRAVENOUS at 08:00

## 2017-05-11 RX ADMIN — INSULIN LISPRO SCH UNITS: 100 INJECTION, SOLUTION INTRAVENOUS; SUBCUTANEOUS at 12:45

## 2017-05-11 RX ADMIN — SODIUM CHLORIDE, PRESERVATIVE FREE SCH ML: 5 INJECTION INTRAVENOUS at 13:48

## 2017-05-11 RX ADMIN — ASPIRIN SCH MG: 81 TABLET ORAL at 09:32

## 2017-05-11 RX ADMIN — DOCUSATE SODIUM,SENNOSIDES SCH TAB: 50; 8.6 TABLET, FILM COATED ORAL at 09:00

## 2017-05-11 RX ADMIN — SENNOSIDES SCH TAB: 8.6 TABLET, FILM COATED ORAL at 20:53

## 2017-05-11 RX ADMIN — CLOPIDOGREL BISULFATE SCH MG: 75 TABLET ORAL at 09:32

## 2017-05-11 RX ADMIN — ATORVASTATIN CALCIUM SCH MG: 20 TABLET, FILM COATED ORAL at 20:47

## 2017-05-11 RX ADMIN — INSULIN LISPRO SCH UNITS: 100 INJECTION, SOLUTION INTRAVENOUS; SUBCUTANEOUS at 17:26

## 2017-05-11 NOTE — DS.PDOC
Discharge Summary


General


Date of Admission


May 7, 2017 at 20:50


Date of Discharge


05/11/2017


Primary Care Physician:  Zhou Rodríguez


Attending Physician:  VLAD LOUIS MD


Specialist/Consultants Involve:  JAN LOOMIS MD


Specialist/Consultants Involve


Neurology





Discharge Summary


PROCEDURES PERFORMED DURING STAY: None.





ADMITTING DIAGNOSES: 


1. Cerebrovascular accident with left-sided weakness.


2. New onset diabetes mellitus, type II.


3. Hypertension.





DISCHARGE DIAGNOSES:


1. Cerebrovascular accident with left-sided weakness.


2. New onset diabetes mellitus type 2.


3. Hypokalemia.


4. Tachycardia.





COMPLICATIONS/CHIEF COMPLAINT: Diabetes; Stroke.





HISTORY OF PRESENT ILLNESS: Mr. Rosales is a 56-year-old male, has not seen any 

physician except orthopedic surgeons for the past 10 years and has not had any 

blood work done that he can remember for the past 10 years, who has been 

feeling sick for 1 week.  He felt off balance, which happened intermittently 

throughout the week.  He also had blurring of vision intermittently during the 

week, then on one day last week he checked his blood pressure, which was over 

200; however, he rechecked it later, which came down back to normal.  He was 

also feeling some abnormal sensations over his hands and legs today.  The 

tingling and numbness was all throughout the left side of the body including 

the face, the upper extremities and the lower extremities.  He felt his hands 

and his legs were lighter and numb and he felt that he was not able to make his 

hand do his commands properly.  He thought that he was having a stroke.


 


In the emergency department (ED), on initial arrival, his blood pressure was 

noted to be 216/139, pulse was 110, respiratory rate 16, temperature 98.5, pulse


oximetry 99% in room air.  He had a CT scan of the head dome at [facility], which did not show any acute stroke; however, on physical exam, it 

was felt his left side was weaker and there was drift on the left upper 

extremity, so the concern for stroke remained high, so our hospital was called 

for transfer as they did not have MRA capabilities, so the patient was 

transferred here for evaluation for stroke.


 


On admission here, initially his blood pressure was 210.  Patient was ordered 

MRI and MRA of the brain.  Patient's blood work from [place] was significant 

for hemoglobin of 18.4, hematocrit of 50, platelets of 132.  His sodium was 129

, blood sugar was 326.  Liver function tests were normal.  Renal function tests


were normal.  Coagulation studies were normal.  Patient did say that he took 

someone else's ramipril on Thursday, Friday and Saturday after he noted his 

blood


pressure to be high on Wednesday and after taking the ramipril his blood 

pressure had improved.  He denied any fever or chills.  Denied any chest pain, 

any shortness of breath or cough.  Denied any abdominal pain, nausea, vomiting 

or diarrhea.  He continues to have persistent abnormal sensations on the left 

side


of the body and weakness..





HOSPITAL COURSE: Patient was admitted to the progressive care unit. MRI and MRA 

of the brain without contrast were obtained and are reported below.


Carotid ultrasound bilaterally was obtained and is reported below.  Continued 

aspirin and statin. We'll maintain permissive hypertension. Initiated sliding 

scale insulin with hypoglycemic protocol. Place patient on hydralazine for 

systolic blood pressure over 200 with holding parameters. DVT prophylaxis with 

Lovenox. Patient does not recall criteria for endovascular intervention. 

Neurology recommended continued medical therapy, telemetry monitoring, tobacco 

cessation, and permissive hypertension. Echocardiogram was obtained and report 

is result below. Physical therapy, occupational therapy, and speech therapy 

evaluated patient throughout hospitalization will noting improvements, but also 

recommended the patient continue with skilled rehabilitation therapy. Patient 

developed isolated hypokalemia for which was supplemented. Started on 

carvedilol 6.25 mg twice daily for hypertension and tachycardia which was 

increased to 12.5 mg twice daily. Patient's vital signs improved. Patient 

showed significant clinical improvement sternal hospitalization and was stable 

for transfer to physical medicine and rehabilitation.





DISCHARGE MEDICATIONS: Please see below.


 


ALLERGIES: Please see below.





PHYSICAL EXAMINATION ON DISCHARGE:


VITAL SIGNS: Please see below.


GENERAL: Well-nourished, well-developed  male sitting in chair side 

upon evaluation this morning, no apparent distress


HEENT: Atraumatic, normocephalic, PERRL, EOMI, oral mucosa appears pink and 

moist, nasal septum appears midline, mild right-sided lateral nystagmus


NECK: Soft, supple, trachea midline, no lymphadenopathy appreciated


CARDIOVASCULAR EXAMINATION: Regular rate and rhythm, normal S1 and S2, no murmur

, rub, click appreciated


RESPIRATORY EXAMINATION: Clear to auscultation bilaterally, adequate historian 

extremity airway excursion, no wheeze, rhonchi, crackles appreciated


ABDOMINAL EXAMINATION: Flat, soft, nontender, nondistended, bowel sounds 

appreciated


EXTREMITIES: peripheral pulses appreciated in upper and lower extremities 

bilaterally, equal, symmetrical, +2/4; muscle strength 5/5 in upper and lower 

extremities bilaterally


SKIN: Warm, dry, intact; without edema


NEUROLOGICAL EXAMINATION: Finger to nose on the right intact, tender to nose on 

the left improving, no tongue deviation, sensation improving to the left side 

of the body, no facial asymmetry appreciated


PSYCHIATRIC EXAMINATION: Mood and affect appear appropriate





LABORATORY DATA: Please see below.





IMAGING:


Transthoracic echocardiogram


CONCLUSIONS:


Technically difficult study.  Unable to identify a potential intracardiac source


of his embolic phenomenon.


Mild concentric left ventricle hypertrophy with normal wall motion.


Mildly dilated left atrium with Doppler evidence of an impairment of LV 

diastolic


function and estimated mean left atrial pressure upper limits of normal to 

mildly


increased.


Normal right heart chamber sizes but Doppler sign of perhaps mild to moderate


pulmonary hypertension.


Normal IVC size and collapse against an elevated central venous pressure.


Subtle aortic valvular sclerosis and mitral annular thickening.  If a cardiac


source is seriously suspect in this patient, we would recommend a 

transesophageal


echocardiogram for better visualization.





Carotid duplex


IMPRESSION:


No hemodynamically significant areas of narrowing or stenosis appreciated.


Based on set standards narrowing falls within the less than 50% range.


 


MRA of the brain without contrast


IMPRESSION: 


Focal thrombosis of the mid portion of the basilar artery, with no significant 

flow seen in the right superior cerebellar artery.





MRI of the brain without contrast


IMPRESSION: 


Acute infarct in the posterior right haider.





MRA of the carotids without followed by with contrast


IMPRESSION: 


High-grade focal stenosis versus short segment occlusion of the basilar artery.





CT of the head without contrast


IMPRESSION:


1.  Mild chronic microvascular disease.


2. No evidence of acute intracranial pathology.





PROGNOSIS: Stable.





ACTIVITY: As tolerated.





DIET: Consistent carbohydrate.





DISCHARGE PLAN: See discharge instructions





DISPOSITION: 62 D/T Rehab Facility.





DISCHARGE INSTRUCTIONS:


1. Continue physical therapy and occupational therapy with physical medicine 

and rehabilitation.





ITEMS TO FOLLOWUP ON ON OUTPATIENT:


1. Follow-up with primary care provider.


2. Follow-up with neurology.


3. Further management for diabetes.





DISCHARGE CONDITION: Stable.





TIME SPENT ON DISCHARGE: Greater than 30 minutes.





Vital Signs/I&Os





Vital Signs








  Date Time  Temp Pulse Resp B/P (MAP) Pulse Ox O2 Delivery O2 Flow Rate FiO2


 


5/11/17 13:38    144/74    


 


5/11/17 12:00 98.2 80 20  92 Room Air  














I&O- Last 24 Hours up to 6 AM 


 


 5/11/17





 06:00


 


Intake Total 2760 ml


 


Output Total 1375 ml


 


Balance 1385 ml











Laboratory Data


Labs 24H


Laboratory Tests 2


5/10/17 21:09: Bedside Glucose (Misc Panel) 199H


5/11/17 05:15: 


White Blood Count 8.5, Red Blood Count 5.42, Hemoglobin 17.1, Hematocrit 50.0, 

Mean Corpuscular Volume 92.1, Mean Corpuscular Hemoglobin 31.5, Mean 

Corpuscular Hemoglobin Concent 34.1, Red Cell Distribution Width 13.9, Platelet 

Count 101L, Neutrophils (%) (Auto) 65.8, Lymphocytes (%) (Auto) 23.4L, 

Monocytes (%) (Auto) 5.8H, Eosinophils (%) (Auto) 2.5, Basophils (%) (Auto) 0.6

, Neutrophils # (Auto) 5.6, Lymphocytes # (Auto) 2.0, Monocytes # (Auto) 0.5, 

Eosinophils # (Auto) 0.2, Basophils # (Auto) 0.0, Large Unclassified Cells % 1.8

, Large Unclassified Cells # 0.2, Anion Gap 7L, Glomerular Filtration Rate > 

60.0, Blood Urea Nitrogen 15, Creatinine 0.87, Sodium Level 141, Potassium 

Level 3.7, Chloride Level 107, Carbon Dioxide Level 27, Calcium Level 8.8


5/11/17 11:23: Bedside Glucose (Misc Panel) 151H


CBC/BMP


Laboratory Tests


5/11/17 05:15








Red Blood Count 5.42, Mean Corpuscular Volume 92.1, Mean Corpuscular Hemoglobin 

31.5, Mean Corpuscular Hemoglobin Concent 34.1, Red Cell Distribution Width 13.9

, Neutrophils (%) (Auto) 65.8, Lymphocytes (%) (Auto) 23.4 L, Monocytes (%) (

Auto) 5.8 H, Eosinophils (%) (Auto) 2.5, Basophils (%) (Auto) 0.6, Neutrophils 

# (Auto) 5.6, Lymphocytes # (Auto) 2.0, Monocytes # (Auto) 0.5, Eosinophils # (

Auto) 0.2, Basophils # (Auto) 0.0, Calcium Level 8.8


FSBS





Laboratory Tests








Test


  5/10/17


21:09 5/11/17


11:23 Range/Units


 


 


Bedside Glucose (Misc Panel) 199 151   MG/DL











Discharge Medications


Scheduled


Aspirin (Aspirin EC) 81 Mg Tabec, 81 MG PO QAM


Atorvastatin Calcium (Atorvastatin Calcium) 20 Mg Tab, 40 MG PO QHS


Carvedilol (Carvedilol) 12.5 Mg Tab, 12.5 MG PO BID


Clopidogrel Bisulfate (Clopidogrel) 75 Mg Tab, 75 MG PO DAILY


Insulin Human Lispro (Humalog) 1 Units/0.01 Ml Inj, 0 UNITS SC AC


Insulin Human Lispro (Humalog) 1 Units/0.01 Ml Inj, 0 UNITS SC QHS


Nicotine (Nicotine Transdermal Syst) 21 Mg/24 Hr Dis, 1 PATCH TD DAILY





Scheduled PRN


Diphenhydramine HCl (Diphenhydramine HCl) 50 Mg Cap, 50 MG PO Q4H PRN for 

ALLERGIES, (Reported)





Allergies


Coded Allergies:  


     No Known Drug Allergy (Verified  Allergy, Unknown, 5/7/17)











DURGA MCFARLAND-KEYANNA May 11, 2017 18:52

## 2017-05-12 VITALS — SYSTOLIC BLOOD PRESSURE: 140 MMHG | DIASTOLIC BLOOD PRESSURE: 84 MMHG

## 2017-05-12 VITALS — SYSTOLIC BLOOD PRESSURE: 160 MMHG | DIASTOLIC BLOOD PRESSURE: 90 MMHG

## 2017-05-12 VITALS — DIASTOLIC BLOOD PRESSURE: 84 MMHG | SYSTOLIC BLOOD PRESSURE: 148 MMHG

## 2017-05-12 VITALS — DIASTOLIC BLOOD PRESSURE: 90 MMHG | SYSTOLIC BLOOD PRESSURE: 172 MMHG

## 2017-05-12 LAB
ALBUMIN SERPL BCG-MCNC: 3.3 GM/DL (ref 3.2–5.2)
ALBUMIN/GLOB SERPL: 1.14 {RATIO} (ref 1–1.93)
ALP SERPL-CCNC: 40 U/L (ref 45–117)
ALT SERPL W P-5'-P-CCNC: 39 U/L (ref 12–78)
ANION GAP SERPL CALC-SCNC: 7 MEQ/L (ref 8–16)
AST SERPL-CCNC: 62 U/L (ref 15–37)
BASOPHILS # BLD AUTO: 0 K/MM3 (ref 0–0.2)
BASOPHILS NFR BLD AUTO: 0.6 % (ref 0–1)
BILIRUB SERPL-MCNC: 1 MG/DL (ref 0.2–1)
BUN SERPL-MCNC: 15 MG/DL (ref 7–18)
CALCIUM SERPL-MCNC: 8.4 MG/DL (ref 8.5–10.1)
CHLORIDE SERPL-SCNC: 107 MEQ/L (ref 98–107)
CO2 SERPL-SCNC: 27 MEQ/L (ref 21–32)
CREAT SERPL-MCNC: 0.84 MG/DL (ref 0.7–1.3)
EOSINOPHIL # BLD AUTO: 0.2 K/MM3 (ref 0–0.5)
EOSINOPHIL NFR BLD AUTO: 2.3 % (ref 0–3)
ERYTHROCYTE [DISTWIDTH] IN BLOOD BY AUTOMATED COUNT: 13.9 % (ref 11.5–14.5)
GFR SERPL CREATININE-BSD FRML MDRD: > 60 ML/MIN/{1.73_M2} (ref 56–?)
GLUCOSE SERPL-MCNC: 183 MG/DL (ref 70–105)
LARGE UNSTAINED CELL #: 0.1 K/MM3 (ref 0–0.4)
LARGE UNSTAINED CELL %: 1.8 % (ref 0–4)
LYMPHOCYTES # BLD AUTO: 2.1 K/MM3 (ref 1.5–4.5)
LYMPHOCYTES NFR BLD AUTO: 28.6 % (ref 24–44)
MCH RBC QN AUTO: 30.7 PG (ref 27–33)
MCHC RBC AUTO-ENTMCNC: 34.5 G/DL (ref 32–36.5)
MCV RBC AUTO: 89.1 FL (ref 80–96)
MONOCYTES # BLD AUTO: 0.6 K/MM3 (ref 0–0.8)
MONOCYTES NFR BLD AUTO: 7.9 % (ref 0–5)
NEUTROPHILS # BLD AUTO: 4.1 K/MM3 (ref 1.8–7.7)
NEUTROPHILS NFR BLD AUTO: 58.8 % (ref 36–66)
PLATELET # BLD AUTO: (no result) K/MM3 (ref 150–450)
POTASSIUM SERPL-SCNC: 3.5 MEQ/L (ref 3.5–5.1)
PROT SERPL-MCNC: 6.2 GM/DL (ref 6.4–8.2)
SODIUM SERPL-SCNC: 141 MEQ/L (ref 136–145)
WBC # BLD AUTO: 7 K/MM3 (ref 4–10)

## 2017-05-12 RX ADMIN — SENNOSIDES SCH TAB: 8.6 TABLET, FILM COATED ORAL at 20:38

## 2017-05-12 RX ADMIN — SENNOSIDES SCH TAB: 8.6 TABLET, FILM COATED ORAL at 20:40

## 2017-05-12 RX ADMIN — ENOXAPARIN SODIUM SCH MG: 30 INJECTION SUBCUTANEOUS at 20:37

## 2017-05-12 RX ADMIN — INSULIN LISPRO SCH UNITS: 100 INJECTION, SOLUTION INTRAVENOUS; SUBCUTANEOUS at 20:38

## 2017-05-12 RX ADMIN — ASPIRIN SCH MG: 81 TABLET ORAL at 08:32

## 2017-05-12 RX ADMIN — ATORVASTATIN CALCIUM SCH MG: 20 TABLET, FILM COATED ORAL at 20:38

## 2017-05-12 RX ADMIN — NICOTINE SCH PATCH: 21 PATCH, EXTENDED RELEASE TRANSDERMAL at 08:30

## 2017-05-12 RX ADMIN — ENOXAPARIN SODIUM SCH MG: 30 INJECTION SUBCUTANEOUS at 08:30

## 2017-05-12 RX ADMIN — INSULIN LISPRO SCH UNITS: 100 INJECTION, SOLUTION INTRAVENOUS; SUBCUTANEOUS at 08:30

## 2017-05-12 RX ADMIN — INSULIN LISPRO SCH UNITS: 100 INJECTION, SOLUTION INTRAVENOUS; SUBCUTANEOUS at 12:27

## 2017-05-12 RX ADMIN — INSULIN LISPRO SCH UNITS: 100 INJECTION, SOLUTION INTRAVENOUS; SUBCUTANEOUS at 17:02

## 2017-05-12 RX ADMIN — CLOPIDOGREL BISULFATE SCH MG: 75 TABLET ORAL at 08:32

## 2017-05-12 NOTE — PMRHPE
DATE OF ADMISSION: 05/11/2017

 

REASON FOR ADMISSION/DIAGNOSES:

1. 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

hemiparesis, decreased coordination and balance.

2. Atherosclerotic cardiovascular disease, including hypertension.

3. Newly discovered type 2 diabetes mellitus.

4. Nicotine dependence.

 

PAST MEDICAL HISTORY: Includes:

1. Hypertension.

2. Osteoarthritis.

 

PAST SURGICAL HISTORY: Includes: Bilateral knee surgeries.

 

SOCIAL HISTORY: The patient has been a pack a day smoker until this admission. 
He

drinks occasional alcohol. Denies using illicit drugs.

 

ALLERGIES: He has no known drug allergies.

 

The patient was not on any medications at home. At the time of admission to the

rehabilitation unit, the patient is on Plavix, Lovenox, aspirin, Nicoderm patch,

hydralazine, insulin sliding scale with Humalog, Senokot-S, atorvastatin for

cholesterol, Coreg for hypertension. I have added magnesium hydroxide and Fleets

enema for constipation.

 

REVIEW OF SYSTEMS: 10-point review of systems negative except for the problems

with left-sided weakness, discoordination, and balance noted above.

 

PHYSICAL EXAMINATION:

Blood pressure 160/91, pulse 80, respirations 20, pulse oximetry 92%, and

temperature 98.2.

The patient is a well-nourished, well-developed, middle-aged, white male who is

in no acute distress, sitting up when I first saw him at bedside in a chair and

then just recently after transferred to our unit in bed.

HEENT: Normocephalic / atraumatic, wearing his cap. Hair is graying.

Pupils are equal, round, and reactive to light and accommodation. Extraocular

motions are intact and vision is conjugate. No notable lesion of the oropharynx.

No deviation of the septum. No masses or drainage from the nose.

NECK: Supple.

LUNGS: Clear in all fields to auscultation.

CORONARY: Shows a regular rate and rhythm with normal S1, S2, and 3/4 bilateral

radial pulses with good perfusion in the upper extremities.

ABDOMEN: Mildly obese but nontender with no palpable masses and normal bowel

sounds in all quadrants.

EXTREMITIES: With normal functional range of motion of bilateral upper and lower

extremities.

NEUROLOGIC: The patient is alert and oriented times four. Speech is clear,

coherent and appropriate. Affect is pleasant and cooperative. Memory is good. 
The

patient shows 4+ to 5- strength is his left upper and lower extremity with some

trouble with placement, especially coordinating and his rapid

alternating motion in the left hand are also decreased but within

normal  for these and with 5/5 strength in the right upper and lower extremity. 
Light 

touch is grossly intact in bilateral upper and lower extremities, as is 
vibration but the

patient does note some pins and needle paresthesias in the left upper and lower

extremities.

 

LABORATORY DATA: CBC essentially normal except for low platelet count at 101,
000.

Basic metabolic panel shows elevated blood sugar of 194, otherwise within normal

limits.

 

MRI of the brain shows a right pontine infarct, consistent with an embolic 
source

and some microvascular change diffusely.

 

ASSESSMENT AND PLAN:

1. Rehabilitation of right haider cerebrovascular accident (CVA): The patient's

main deficits are his balance and coordination which are impacting on his safety

in activities of daily living (ADLs) and mobility. His endurance is very good 
and

his ability to walk with support such as a walker is good. However,  when 
transitioning

to stairs or uneven surfaces his discoordination problem is likely to case the 
patient

to have frequent falls and additional injuries. Therefore, he needs further work

on building his coordination skills in ADLs and uneven terrain, stairs, inclines
,

and other type of non-flat areas to be safe to return to home. I feel he would

benefit from physical and occupational therapy three hours per day. He certainly

has the endurance for this. I feel this could not well be accomplished in 
skilled

nursing facility as the patient's blood pressure has been recently markedly

elevated as well as concerns about cardiac rhythm. Therefore, he does need

ongoing cardiac, neurological, and general medical evaluation and followup.

2. New onset diabetes mellitus, type 2: We will go ahead and continue working

with the patient in monitoring and continue his education through nursing on

managing his diabetes. At this time, he remains on a sliding scale but will

probably transition from this as he prepares to go home.

3. Atherosclerotic cardiovascular disease, including hyperlipidemia and

hypertension: As noted above, the patient needs ongoing followup and management

and will continue medication previously noted for this. He may require some

further hydralazine treatment and therefore, we will keep the current IV access.

 

 

POSTADMISSION PHYSICIAN EVALUATION: I feel the patient's status is consistent

with our prior evaluations and notes and the preadmission screening process. I

feel, as noted above, that he is likely to benefit from acute intensive

rehabilitation and will be able to participate in three hours of therapy per 
day.

I anticipate the patient will benefit from this to allow his return to home. I

feel his prognosis is good. I estimate his length of stay at 7-10 days.

 

Time spent on chart review, history and physical, and documentation: Greater 
than

70 minutes.

MTDD

## 2017-05-13 VITALS — SYSTOLIC BLOOD PRESSURE: 158 MMHG | DIASTOLIC BLOOD PRESSURE: 88 MMHG

## 2017-05-13 VITALS — SYSTOLIC BLOOD PRESSURE: 136 MMHG | DIASTOLIC BLOOD PRESSURE: 70 MMHG

## 2017-05-13 VITALS — SYSTOLIC BLOOD PRESSURE: 170 MMHG | DIASTOLIC BLOOD PRESSURE: 81 MMHG

## 2017-05-13 VITALS — SYSTOLIC BLOOD PRESSURE: 174 MMHG | DIASTOLIC BLOOD PRESSURE: 98 MMHG

## 2017-05-13 VITALS — DIASTOLIC BLOOD PRESSURE: 78 MMHG | SYSTOLIC BLOOD PRESSURE: 138 MMHG

## 2017-05-13 RX ADMIN — CLOPIDOGREL BISULFATE SCH MG: 75 TABLET ORAL at 08:13

## 2017-05-13 RX ADMIN — NICOTINE SCH PATCH: 21 PATCH, EXTENDED RELEASE TRANSDERMAL at 08:14

## 2017-05-13 RX ADMIN — INSULIN LISPRO SCH UNITS: 100 INJECTION, SOLUTION INTRAVENOUS; SUBCUTANEOUS at 08:13

## 2017-05-13 RX ADMIN — INSULIN LISPRO SCH UNITS: 100 INJECTION, SOLUTION INTRAVENOUS; SUBCUTANEOUS at 13:02

## 2017-05-13 RX ADMIN — ATORVASTATIN CALCIUM SCH MG: 20 TABLET, FILM COATED ORAL at 20:57

## 2017-05-13 RX ADMIN — INSULIN LISPRO SCH UNITS: 100 INJECTION, SOLUTION INTRAVENOUS; SUBCUTANEOUS at 17:53

## 2017-05-13 RX ADMIN — ENOXAPARIN SODIUM SCH MG: 30 INJECTION SUBCUTANEOUS at 08:13

## 2017-05-13 RX ADMIN — LISINOPRIL SCH MG: 5 TABLET ORAL at 08:13

## 2017-05-13 RX ADMIN — ENOXAPARIN SODIUM SCH MG: 30 INJECTION SUBCUTANEOUS at 20:58

## 2017-05-13 RX ADMIN — INSULIN LISPRO SCH UNITS: 100 INJECTION, SOLUTION INTRAVENOUS; SUBCUTANEOUS at 20:51

## 2017-05-13 RX ADMIN — ASPIRIN SCH MG: 81 TABLET ORAL at 08:13

## 2017-05-13 RX ADMIN — SENNOSIDES SCH TAB: 8.6 TABLET, FILM COATED ORAL at 21:00

## 2017-05-14 VITALS — SYSTOLIC BLOOD PRESSURE: 161 MMHG | DIASTOLIC BLOOD PRESSURE: 82 MMHG

## 2017-05-14 VITALS — DIASTOLIC BLOOD PRESSURE: 84 MMHG | SYSTOLIC BLOOD PRESSURE: 154 MMHG

## 2017-05-14 VITALS — SYSTOLIC BLOOD PRESSURE: 154 MMHG | DIASTOLIC BLOOD PRESSURE: 88 MMHG

## 2017-05-14 LAB
ERYTHROCYTE [DISTWIDTH] IN BLOOD BY AUTOMATED COUNT: 13.6 % (ref 11.5–14.5)
MCH RBC QN AUTO: 30.4 PG (ref 27–33)
MCHC RBC AUTO-ENTMCNC: 34.7 G/DL (ref 32–36.5)
MCV RBC AUTO: 87.7 FL (ref 80–96)
PLATELET # BLD AUTO: 91 K/MM3 (ref 150–450)
WBC # BLD AUTO: 6.4 K/MM3 (ref 4–10)

## 2017-05-14 RX ADMIN — CLOPIDOGREL BISULFATE SCH MG: 75 TABLET ORAL at 08:10

## 2017-05-14 RX ADMIN — ASPIRIN SCH MG: 81 TABLET ORAL at 08:10

## 2017-05-14 RX ADMIN — SENNOSIDES SCH TAB: 8.6 TABLET, FILM COATED ORAL at 20:26

## 2017-05-14 RX ADMIN — ATORVASTATIN CALCIUM SCH MG: 20 TABLET, FILM COATED ORAL at 20:34

## 2017-05-14 RX ADMIN — ENOXAPARIN SODIUM SCH MG: 30 INJECTION SUBCUTANEOUS at 08:26

## 2017-05-14 RX ADMIN — ENOXAPARIN SODIUM SCH MG: 30 INJECTION SUBCUTANEOUS at 20:35

## 2017-05-14 RX ADMIN — INSULIN LISPRO SCH UNITS: 100 INJECTION, SOLUTION INTRAVENOUS; SUBCUTANEOUS at 18:38

## 2017-05-14 RX ADMIN — INSULIN LISPRO SCH UNITS: 100 INJECTION, SOLUTION INTRAVENOUS; SUBCUTANEOUS at 13:09

## 2017-05-14 RX ADMIN — INSULIN LISPRO SCH UNITS: 100 INJECTION, SOLUTION INTRAVENOUS; SUBCUTANEOUS at 08:10

## 2017-05-14 RX ADMIN — LISINOPRIL SCH MG: 5 TABLET ORAL at 08:10

## 2017-05-14 RX ADMIN — ENOXAPARIN SODIUM SCH MG: 30 INJECTION SUBCUTANEOUS at 08:11

## 2017-05-14 RX ADMIN — NICOTINE SCH PATCH: 21 PATCH, EXTENDED RELEASE TRANSDERMAL at 08:11

## 2017-05-14 RX ADMIN — INSULIN LISPRO SCH UNITS: 100 INJECTION, SOLUTION INTRAVENOUS; SUBCUTANEOUS at 20:33

## 2017-05-15 VITALS — SYSTOLIC BLOOD PRESSURE: 181 MMHG | DIASTOLIC BLOOD PRESSURE: 87 MMHG

## 2017-05-15 VITALS — DIASTOLIC BLOOD PRESSURE: 78 MMHG | SYSTOLIC BLOOD PRESSURE: 135 MMHG

## 2017-05-15 VITALS — DIASTOLIC BLOOD PRESSURE: 79 MMHG | SYSTOLIC BLOOD PRESSURE: 150 MMHG

## 2017-05-15 RX ADMIN — ENOXAPARIN SODIUM SCH MG: 30 INJECTION SUBCUTANEOUS at 20:52

## 2017-05-15 RX ADMIN — NICOTINE SCH PATCH: 21 PATCH, EXTENDED RELEASE TRANSDERMAL at 08:34

## 2017-05-15 RX ADMIN — INSULIN LISPRO SCH UNITS: 100 INJECTION, SOLUTION INTRAVENOUS; SUBCUTANEOUS at 17:02

## 2017-05-15 RX ADMIN — SENNOSIDES SCH TAB: 8.6 TABLET, FILM COATED ORAL at 20:51

## 2017-05-15 RX ADMIN — ASPIRIN SCH MG: 81 TABLET ORAL at 08:33

## 2017-05-15 RX ADMIN — INSULIN LISPRO SCH UNITS: 100 INJECTION, SOLUTION INTRAVENOUS; SUBCUTANEOUS at 20:52

## 2017-05-15 RX ADMIN — INSULIN LISPRO SCH UNITS: 100 INJECTION, SOLUTION INTRAVENOUS; SUBCUTANEOUS at 08:33

## 2017-05-15 RX ADMIN — LISINOPRIL SCH MG: 5 TABLET ORAL at 08:34

## 2017-05-15 RX ADMIN — ATORVASTATIN CALCIUM SCH MG: 20 TABLET, FILM COATED ORAL at 20:51

## 2017-05-15 RX ADMIN — ENOXAPARIN SODIUM SCH MG: 30 INJECTION SUBCUTANEOUS at 08:33

## 2017-05-15 RX ADMIN — SENNOSIDES SCH TAB: 8.6 TABLET, FILM COATED ORAL at 20:56

## 2017-05-15 RX ADMIN — CLOPIDOGREL BISULFATE SCH MG: 75 TABLET ORAL at 08:33

## 2017-05-15 RX ADMIN — INSULIN LISPRO SCH UNITS: 100 INJECTION, SOLUTION INTRAVENOUS; SUBCUTANEOUS at 13:37

## 2017-05-15 NOTE — IPNPDOC
Physiatrist Progress Note


DATE OF SERVICE:  5/15/17





DATE OF ADMISSION: May 11, 2017 at 16:25 


INPATIENT REHABILITATION ADMISSION DAY: #4





SUBJECTIVE: Patient is a 56-year-old white male with right pontine CVA with 

left jessica-paresis and trouble with coordination of left body to right body. 

Patient without complaints today other then having bumped his knee against the 

day bed in his room. No GI/, pain or other complaints.





ALLERGIES: See Below





MEDICATIONS: Reviewed, see below.





OBJECTIVE:


VITAL SIGNS: Please see below.


PHYSICAL EXAMINATION:


GENERAL: Well-nourished well-developed middle-aged white male who is alert and 

well oriented. Patient is in minimal to no acute distress.


HEENT: Normocephalic/atraumatic.


CARDIOVASCULAR: Regular rate and rhythm with normal S1 and S2. 2/4 pulses and 

bilateral radials.


LUNGS: All fields clear to auscultation.


ABDOMEN: Benign with normal bowel sounds.


NEUROLOGICAL: Patient was uncooperative and in good spirits. He is very 

talkative but it is clear and coherent. Memory is good. Mild paresis of left 

upper and lower extremity mainly a problem as coordination of movement.


SKIN: Patient with 2 small bruises on left knee region inferiorly. These are 

about 1/2 cm in diameter.





LABORATORY DATA: Reviewed. Please see below.





MICROBIOLOGY: Please see below.





IMAGING: No new imaging.





DVT prophylaxis ordered?: Plavix and Lovenox.





ASSESSMENT AND PLAN:


1. Rehabilitation of right pontine CVA: Patient participating vigorously in 

therapy and is making progress especially on strength and endurance. Pain 

deficits or balance and coordination which limits his safety. His endurance and 

gaiting with a walker are doing very well, but his risk of falling remains 

fairly elevated due to distractability and decreased balance and coordination. 

Per team discussion/consensus ELOS is 10 to 14 days with Target D/C to home on 5 /24/17.





2. Anemia: Patient dropped from 45-39 on his hematocrit between the 11th and 

the 14th. I will go ahead and repeat a CMP with his next CBC on 5/17. But for 

now vital signs are stable.





3. Diabetes mellitus type 2: This is remaining fairly stable.





TIME SPENT: Chart Review, examination and documentation greater than 35 minutes.


Allergies


Coded Allergies:  


     No Known Drug Allergy (Verified  Allergy, Unknown, 5/7/17)





Vital Signs





Vital Signs








  Date Time  Temp Pulse Resp B/P (MAP) Pulse Ox O2 Delivery O2 Flow Rate FiO2


 


5/15/17 08:34    150/79    


 


5/15/17 08:33  87      


 


5/15/17 06:00 98.9  16  97 Room Air  











Laboratory Data


Labs 24H


Laboratory Tests 2


5/14/17 18:10: Bedside Glucose (Misc Panel) 205H


5/14/17 20:33: Bedside Glucose (Misc Panel) 147H


5/15/17 06:44: Bedside Glucose (Misc Panel) 134H





Current Medications


Current Medications





Current Medications


Aspirin (Ecotrin) 81 mg QAM PO  Last administered on 5/15/17at 08:33;  Start 5/ 12/17 at 09:00;  Stop 6/11/17 at 08:59


Atorvastatin Calcium (Lipitor) 40 mg QHS PO  Last administered on 5/14/17at 20:

34;  Start 5/11/17 at 21:00;  Stop 6/10/17 at 20:59


Carvedilol (COReg) 25 mg BID PO  Last administered on 5/15/17at 08:33;  Start 5/ 11/17 at 21:00;  Stop 6/10/17 at 20:59


Clopidogrel Bisulfate (PLAVix) 75 mg DAILY PO  Last administered on 5/15/17at 08

:33;  Start 5/12/17 at 09:00;  Stop 6/11/17 at 08:59


Dextrose (Dextrose 50%) 25 ml ASDIRECTED  PRN IV SEE LABEL COMMENTS;  Start 5/11 /17 at 16:45;  Stop 6/10/17 at 16:44


Enoxaparin Sodium (Lovenox) 30 mg BID SC  Last administered on 5/15/17at 08:33;

  Start 5/11/17 at 21:00;  Stop 5/16/17 at 20:59


Glucagon (Glucagon) 1 mg ASDIRECTED  PRN SC SEE LABEL COMMENTS;  Start 5/11/17 

at 16:45;  Stop 6/10/17 at 16:44


Glucose (Glucose) 16 GM ASDIRECTED  PRN PO SEE LABEL COMMENTS;  Start 5/11/17 

at 16:45;  Stop 6/10/17 at 16:44


Hydralazine HCl (Apresoline) 5 mg Q8H  PRN IV HYPERTENSION;  Start 5/11/17 at 17

:30;  Stop 6/10/17 at 17:29;  Status UNV


Hydralazine HCl (Apresoline) 10 mg Q8HP  PRN PO HTN;  Start 5/11/17 at 17:45;  

Stop 6/10/17 at 17:44


Insulin Human Lispro (HumaLOG INSULIN)  AC SC  Last administered on 5/12/17at 17

:02;  Start 5/11/17 at 17:30;  Stop 5/13/17 at 07:34;  Status DC


Insulin Human Lispro (HumaLOG INSULIN)  QHS SC ;  Start 5/11/17 at 21:00;  Stop 

5/13/17 at 07:34;  Status DC


Insulin Human Lispro (HumaLOG INSULIN) SEE PROTOCOL TABLE AC SC  Last 

administered on 5/15/17at 08:33;  Start 5/13/17 at 07:30;  Stop 6/12/17 at 07:29


Insulin Human Lispro (HumaLOG INSULIN) SEE PROTOCOL TABLE QHS SC ;  Start 5/13/ 17 at 21:00;  Stop 6/12/17 at 20:59


Lisinopril (Prinivil) 5 mg DAILY PO  Last administered on 5/15/17at 08:34;  

Start 5/13/17 at 09:00;  Stop 6/12/17 at 08:59


Magnesium Hydroxide (Milk Of Magnesia) 30 ml DAILYPRN  PRN PO CONSTIPATION;  

Start 5/11/17 at 15:30;  Stop 6/10/17 at 15:29


Nicotine (Nicoderm Cq 21mg) 1 patch DAILY TD  Last administered on 5/15/17at 08:

34;  Start 5/12/17 at 09:00;  Stop 6/11/17 at 08:59


Senna (Senokot) 1 tab QHS PO ;  Start 5/11/17 at 21:00;  Stop 6/10/17 at 20:59


Sodium Biphosphate/ Sodium Phosphate (Fleet Enema) 1 ea DAILYPRN  PRN [place] 

CONSTIPATION;  Start 5/11/17 at 15:30;  Stop 6/10/17 at 15:29











KHANH AMAYA MD May 15, 2017 11:48

## 2017-05-15 NOTE — IPNPDOC
Subjective


Date Seen


The patient was seen on 5/15/17.





Subjective


Chief Complaint/HPI


The patient is a 56-year-old male admitted with a reason for visit of Stroke-

Right Carlos/Right Hemiparesis.


Events since last encounter


Pt states he is doing well. 


Pins and needle sensation left side, slightly less. 


Feels he is gaining strength.


ENT:  Denies: Head Aches, Ear Pain, Dysphagia


Pulmonary:  Denies: Dyspnea, Cough


Cardiovascular:  Denies: Chest Pain, Palpitations, Orthopnea, Paroxysmal Noc. 

Dyspnea, Lt Headedness


Gastrointestinal:  Denies: Nausea, Vomiting, Abdominal Pain, Diarrhea, 

Constipation


Genitourinary:  Denies: Dysuria, Frequency, Incontinence, Retention





Objective


Physical Examination


General Exam:  Positive: Alert


Eye Exam:  Positive: PERRLA


ENT Exam:  Positive: Atraumatic


Neck Exam:  Positive: Supple


Chest Exam:  Positive: Clear to auscultation, Normal air movement


Heart Exam:  Positive: Rate Normal, Regular Rhythm, Normal S1, Normal S2, 


   Negative: Murmurs, Rubs


Abdomen Exam:  Positive: Normal bowel sounds, Soft, 


   Negative: Tenderness, Hepatospenomegaly


Skin Exam:  Positive: Nl turgor and temperature





Assessment /Plan


Problems





(1) Stroke


Problem Text:  


* ARU as per Dr Perez 


* PT/OT


* ASA 81/Plavix/statin


* Outpt f/u with Neuro. 





(2) HTN (hypertension)


Problem Text:  


 * Coreg


 * lisinopril


 * Hydralazine with hold paramters





(3) Diabetes


Problem Text:  


* diet


* SSI


* 137-147








Plan/VTE


VTE Prophylaxis Ordered?:  Yes (Lovenox)





Disposition


as per ARU





VS, I&O, 24H, Raj


Vital Signs/I&O





Vital Signs








  Date Time  Temp Pulse Resp B/P (MAP) Pulse Ox O2 Delivery O2 Flow Rate FiO2


 


5/15/17 14:00 97.8 87 18 181/87 (118) 98 Room Air  














I&O- Last 24 Hours up to 6 AM 


 


 5/15/17





 06:00


 


Intake Total 600 ml


 


Output Total 1477 ml


 


Balance -877 ml











Laboratory Data


24H LABS


Laboratory Tests 2


5/14/17 18:10: Bedside Glucose (Misc Panel) 205H


5/14/17 20:33: Bedside Glucose (Misc Panel) 147H


5/15/17 06:44: Bedside Glucose (Misc Panel) 134H


5/15/17 12:16: Bedside Glucose (Misc Panel) 137H











Lola Lundberg May 15, 2017 14:30

## 2017-05-16 VITALS — DIASTOLIC BLOOD PRESSURE: 77 MMHG | SYSTOLIC BLOOD PRESSURE: 146 MMHG

## 2017-05-16 VITALS — DIASTOLIC BLOOD PRESSURE: 68 MMHG | SYSTOLIC BLOOD PRESSURE: 133 MMHG

## 2017-05-16 VITALS — SYSTOLIC BLOOD PRESSURE: 164 MMHG | DIASTOLIC BLOOD PRESSURE: 83 MMHG

## 2017-05-16 RX ADMIN — INSULIN LISPRO SCH UNITS: 100 INJECTION, SOLUTION INTRAVENOUS; SUBCUTANEOUS at 08:00

## 2017-05-16 RX ADMIN — ENOXAPARIN SODIUM SCH MG: 30 INJECTION SUBCUTANEOUS at 09:15

## 2017-05-16 RX ADMIN — CLOPIDOGREL BISULFATE SCH MG: 75 TABLET ORAL at 09:17

## 2017-05-16 RX ADMIN — ATORVASTATIN CALCIUM SCH MG: 20 TABLET, FILM COATED ORAL at 20:05

## 2017-05-16 RX ADMIN — INSULIN LISPRO SCH UNITS: 100 INJECTION, SOLUTION INTRAVENOUS; SUBCUTANEOUS at 17:30

## 2017-05-16 RX ADMIN — INSULIN LISPRO SCH UNITS: 100 INJECTION, SOLUTION INTRAVENOUS; SUBCUTANEOUS at 19:48

## 2017-05-16 RX ADMIN — ASPIRIN SCH MG: 81 TABLET ORAL at 09:17

## 2017-05-16 RX ADMIN — INSULIN LISPRO SCH UNITS: 100 INJECTION, SOLUTION INTRAVENOUS; SUBCUTANEOUS at 12:24

## 2017-05-16 RX ADMIN — LISINOPRIL SCH MG: 5 TABLET ORAL at 09:19

## 2017-05-16 RX ADMIN — ENOXAPARIN SODIUM SCH MG: 30 INJECTION SUBCUTANEOUS at 20:05

## 2017-05-16 RX ADMIN — SENNOSIDES SCH TAB: 8.6 TABLET, FILM COATED ORAL at 19:48

## 2017-05-16 RX ADMIN — NICOTINE SCH PATCH: 21 PATCH, EXTENDED RELEASE TRANSDERMAL at 09:17

## 2017-05-16 NOTE — IPNPDOC
Physiatrist Progress Note


DATE OF SERVICE:  5/16/17





DATE OF ADMISSION: May 11, 2017 at 16:25 


INPATIENT REHABILITATION ADMISSION DAY: #5





SUBJECTIVE: Patient is a 56-year-old white male with right pontine CVA with 

left hemiparesis/discoordination and decreased balance. Patient without 

complaints today. Reports feeling good. He notes feeling tight in his muscles 

on the left side of the body but not having the pins and needle tingling on the 

left but sensation is actually more normal feeling to him on the left body.





ALLERGIES: See Below





MEDICATIONS: Reviewed, see below.





OBJECTIVE:


VITAL SIGNS: Please see below.


PHYSICAL EXAMINATION:


GENERAL: Well-nourished well-developed middle-aged white male who is alert and 

oriented 4. Speech is clear coherent and appropriate. Affect is less 

uncooperative with patient in good spirits. Patient is in no acute distress.


HEENT: Normocephalic/atraumatic.


CARDIOVASCULAR: Regular rate and rhythm with normal S1 and S2. 2 out 4 radial 

pulses.


LUNGS: All fields clear to auscultation.


ABDOMEN: Benign with normal bowel sounds throughout.


NEUROLOGICAL: As noted above.


SKIN: Left knee contusions healing.





LABORATORY DATA: Reviewed. Please see below.





DVT prophylaxis ordered?: Plavix and Lovenox with RACHEL hose.





ASSESSMENT AND PLAN:


1. Rehabilitation right pontine CVA: Patient working very hard with physical 

occupational therapy and gaining endurance. However his balance and 

coordination are things were more concerned about further patient's safety. We 

will continue to have patient work on over learning mobility transfer and ADL 

skills retraining reinforce safe technique and develop more balance and 

coordination.





2. DVT prophylaxis: Patient tolerating Plavix and Lovenox well.





3. Type 2 DM: Blood sugars in general have been good and stable for this new 

onset type II diabetic.





4. Hypertension: Patient elevated this morning before medications and first 

measurement after was still systolic blood pressure 140. We will continue to 

watch. But in general systolic blood pressures of around 140 or good for 

healing post CVA.





TIME SPENT: Chart Review, examination and documentation greater than 25 minutes.


Allergies


Coded Allergies:  


     No Known Drug Allergy (Verified  Allergy, Unknown, 5/7/17)





Vital Signs





Vital Signs








  Date Time  Temp Pulse Resp B/P (MAP) Pulse Ox O2 Delivery O2 Flow Rate FiO2


 


5/16/17 09:18  87  140/100    


 


5/16/17 06:00 98.5  18  95 Room Air  











Laboratory Data


Labs 24H


Laboratory Tests 2


5/15/17 12:16: Bedside Glucose (Misc Panel) 137H


5/15/17 16:57: Bedside Glucose (Misc Panel) 121H


5/15/17 19:57: Bedside Glucose (Misc Panel) 131H


5/16/17 05:58: Bedside Glucose (Misc Panel) 131H





Current Medications


Current Medications





Current Medications


Aspirin (Ecotrin) 81 mg QAM PO  Last administered on 5/16/17at 09:17;  Start 5/ 12/17 at 09:00;  Stop 6/11/17 at 08:59


Atorvastatin Calcium (Lipitor) 40 mg QHS PO  Last administered on 5/15/17at 20:

51;  Start 5/11/17 at 21:00;  Stop 6/10/17 at 20:59


Carvedilol (COReg) 25 mg BID PO  Last administered on 5/16/17at 09:18;  Start 5/ 11/17 at 21:00;  Stop 6/10/17 at 20:59


Clopidogrel Bisulfate (PLAVix) 75 mg DAILY PO  Last administered on 5/16/17at 09

:17;  Start 5/12/17 at 09:00;  Stop 6/11/17 at 08:59


Dextrose (Dextrose 50%) 25 ml ASDIRECTED  PRN IV SEE LABEL COMMENTS;  Start 5/11 /17 at 16:45;  Stop 6/10/17 at 16:44


Enoxaparin Sodium (Lovenox) 30 mg BID SC  Last administered on 5/16/17at 09:15;

  Start 5/11/17 at 21:00;  Stop 5/20/17 at 20:59


Glucagon (Glucagon) 1 mg ASDIRECTED  PRN SC SEE LABEL COMMENTS;  Start 5/11/17 

at 16:45;  Stop 6/10/17 at 16:44


Glucose (Glucose) 16 GM ASDIRECTED  PRN PO SEE LABEL COMMENTS;  Start 5/11/17 

at 16:45;  Stop 6/10/17 at 16:44


Hydralazine HCl (Apresoline) 5 mg Q8H  PRN IV HYPERTENSION;  Start 5/11/17 at 17

:30;  Stop 6/10/17 at 17:29;  Status UNV


Hydralazine HCl (Apresoline) 10 mg Q8HP  PRN PO HTN;  Start 5/11/17 at 17:45;  

Stop 6/10/17 at 17:44


Insulin Human Lispro (HumaLOG INSULIN)  AC SC  Last administered on 5/12/17at 17

:02;  Start 5/11/17 at 17:30;  Stop 5/13/17 at 07:34;  Status DC


Insulin Human Lispro (HumaLOG INSULIN)  QHS SC ;  Start 5/11/17 at 21:00;  Stop 

5/13/17 at 07:34;  Status DC


Insulin Human Lispro (HumaLOG INSULIN) SEE PROTOCOL TABLE AC SC  Last 

administered on 5/16/17at 08:00;  Start 5/13/17 at 07:30;  Stop 6/12/17 at 07:29


Insulin Human Lispro (HumaLOG INSULIN) SEE PROTOCOL TABLE QHS SC ;  Start 5/13/ 17 at 21:00;  Stop 6/12/17 at 20:59


Lisinopril (Prinivil) 5 mg DAILY PO  Last administered on 5/16/17at 09:19;  

Start 5/13/17 at 09:00;  Stop 6/12/17 at 08:59


Magnesium Hydroxide (Milk Of Magnesia) 30 ml DAILYPRN  PRN PO CONSTIPATION;  

Start 5/11/17 at 15:30;  Stop 6/10/17 at 15:29


Nicotine (Nicoderm Cq 21mg) 1 patch DAILY TD  Last administered on 5/16/17at 09:

17;  Start 5/12/17 at 09:00;  Stop 6/11/17 at 08:59


Senna (Senokot) 1 tab QHS PO ;  Start 5/11/17 at 21:00;  Stop 6/10/17 at 20:59


Sodium Biphosphate/ Sodium Phosphate (Fleet Enema) 1 ea DAILYPRN  PRN [place] 

CONSTIPATION;  Start 5/11/17 at 15:30;  Stop 6/10/17 at 15:29











KHANH AMAYA MD May 16, 2017 10:07

## 2017-05-17 VITALS — SYSTOLIC BLOOD PRESSURE: 136 MMHG | DIASTOLIC BLOOD PRESSURE: 71 MMHG

## 2017-05-17 VITALS — DIASTOLIC BLOOD PRESSURE: 88 MMHG | SYSTOLIC BLOOD PRESSURE: 168 MMHG

## 2017-05-17 VITALS — DIASTOLIC BLOOD PRESSURE: 84 MMHG | SYSTOLIC BLOOD PRESSURE: 159 MMHG

## 2017-05-17 VITALS — SYSTOLIC BLOOD PRESSURE: 154 MMHG | DIASTOLIC BLOOD PRESSURE: 74 MMHG

## 2017-05-17 LAB
ALBUMIN SERPL BCG-MCNC: 3.1 GM/DL (ref 3.2–5.2)
ALBUMIN/GLOB SERPL: 1.03 {RATIO} (ref 1–1.93)
ALP SERPL-CCNC: 40 U/L (ref 45–117)
ALT SERPL W P-5'-P-CCNC: 50 U/L (ref 12–78)
ANION GAP SERPL CALC-SCNC: 8 MEQ/L (ref 8–16)
AST SERPL-CCNC: 30 U/L (ref 15–37)
BILIRUB SERPL-MCNC: 0.6 MG/DL (ref 0.2–1)
BUN SERPL-MCNC: 10 MG/DL (ref 7–18)
CALCIUM SERPL-MCNC: 7.7 MG/DL (ref 8.5–10.1)
CHLORIDE SERPL-SCNC: 108 MEQ/L (ref 98–107)
CO2 SERPL-SCNC: 25 MEQ/L (ref 21–32)
CREAT SERPL-MCNC: 0.69 MG/DL (ref 0.7–1.3)
ERYTHROCYTE [DISTWIDTH] IN BLOOD BY AUTOMATED COUNT: 13.5 % (ref 11.5–14.5)
GFR SERPL CREATININE-BSD FRML MDRD: > 60 ML/MIN/{1.73_M2} (ref 56–?)
GLUCOSE SERPL-MCNC: 164 MG/DL (ref 70–105)
MCH RBC QN AUTO: 31 PG (ref 27–33)
MCHC RBC AUTO-ENTMCNC: 34.8 G/DL (ref 32–36.5)
MCV RBC AUTO: 89 FL (ref 80–96)
PLATELET # BLD AUTO: 100 K/MM3 (ref 150–450)
POTASSIUM SERPL-SCNC: 3.6 MEQ/L (ref 3.5–5.1)
PROT SERPL-MCNC: 6.1 GM/DL (ref 6.4–8.2)
SODIUM SERPL-SCNC: 141 MEQ/L (ref 136–145)
WBC # BLD AUTO: 7.3 K/MM3 (ref 4–10)

## 2017-05-17 RX ADMIN — ATORVASTATIN CALCIUM SCH MG: 20 TABLET, FILM COATED ORAL at 20:48

## 2017-05-17 RX ADMIN — INSULIN LISPRO SCH UNITS: 100 INJECTION, SOLUTION INTRAVENOUS; SUBCUTANEOUS at 21:00

## 2017-05-17 RX ADMIN — INSULIN LISPRO SCH UNITS: 100 INJECTION, SOLUTION INTRAVENOUS; SUBCUTANEOUS at 08:11

## 2017-05-17 RX ADMIN — LISINOPRIL SCH MG: 5 TABLET ORAL at 08:11

## 2017-05-17 RX ADMIN — INSULIN LISPRO SCH UNITS: 100 INJECTION, SOLUTION INTRAVENOUS; SUBCUTANEOUS at 13:52

## 2017-05-17 RX ADMIN — ENOXAPARIN SODIUM SCH MG: 30 INJECTION SUBCUTANEOUS at 08:12

## 2017-05-17 RX ADMIN — NICOTINE SCH PATCH: 21 PATCH, EXTENDED RELEASE TRANSDERMAL at 08:12

## 2017-05-17 RX ADMIN — HYDROCODONE BITARTRATE AND ACETAMINOPHEN PRN TAB: 5; 325 TABLET ORAL at 22:32

## 2017-05-17 RX ADMIN — INSULIN LISPRO SCH UNITS: 100 INJECTION, SOLUTION INTRAVENOUS; SUBCUTANEOUS at 18:42

## 2017-05-17 RX ADMIN — ASPIRIN SCH MG: 81 TABLET ORAL at 08:11

## 2017-05-17 RX ADMIN — SENNOSIDES SCH TAB: 8.6 TABLET, FILM COATED ORAL at 20:52

## 2017-05-17 RX ADMIN — ENOXAPARIN SODIUM SCH MG: 30 INJECTION SUBCUTANEOUS at 20:50

## 2017-05-17 RX ADMIN — CLOPIDOGREL BISULFATE SCH MG: 75 TABLET ORAL at 08:11

## 2017-05-17 NOTE — IPNPDOC
Physiatrist Progress Note


DATE OF SERVICE:  5/17/17





DATE OF ADMISSION: May 11, 2017 at 16:25 


INPATIENT REHABILITATION ADMISSION DAY: #6





SUBJECTIVE: Patient is a 56-year-old white male with a pontine CVA. Patient 

reports feeling good having no pain or other complaints. He notes some 

improvement in sensation on his left upper and lower extremity as a pins and 

needles sensations have gone down.





ALLERGIES: See Below





MEDICATIONS: Reviewed, see below.





OBJECTIVE:


VITAL SIGNS: Please see below.


PHYSICAL EXAMINATION:


GENERAL: Well-nourished well-developed middle-aged white male who is alert and 

oriented 4. Speech is clear coherent and appropriate. Affect is pleasant and 

cooperative the patient in good mood patient is in minimal to no acute distress.


HEENT: Normocephalic/atraumatic.


CARDIOVASCULAR: Regular rate and rhythm with normal S1-S2 in 2 out of 4 radial 

pulses.


LUNGS: All fields clear to auscultation.


ABDOMEN: Benign with normal bowel sounds.


NEUROLOGICAL: As above with some continued mild weakness in the left upper and 

lower extremity and decreased coordination of 2 handed activities.


SKIN: Contusions of left knee continued to improve. Abrasion above the left 

shin as almost cleared with normal skin growth.





LABORATORY DATA: Reviewed. Please see below.





MICROBIOLOGY: Please see below.





IMAGING: No new imaging.





DVT prophylaxis ordered?: Patient continues on Plavix and Lovenox with RACHEL 

hose. Patient also ambulating frequently for moderate distances.





ASSESSMENT AND PLAN:


1. Rehabilitation of right pontine CVA: Patient working hard in therapies and 

continue progress with PT and OT. No changes in overall plan with anticipated 

date of discharge May 24th to home with family.





2. Anemia: Today's H&H was decreased to the point of reaching a very mild 

anemia with 13.8 for hemoglobin 39.6% for hematocrit. Thrombose pain he is 

improved at 100,000 platelets.





3. Type 2 diabetes mellitus: Fasting blood sugar 164 today and other 

fingersticks tend to be in the 100 range. So it appears that the patient is 

under fairly good control at this time.





4. Hypertension: During her last 36 hours the patient has tended to run 

systolic blood pressures in the 150s just slightly increased. Patient reports 

drinking about 6 cups of coffee per day with caffeine 5 asking him to cut back 

to 2 cups per day: 1 cup with breakfast and 1 cup with lunch.





TIME SPENT: Chart Review, examination and documentation greater than 20 minutes.


Allergies


Coded Allergies:  


     No Known Drug Allergy (Verified  Allergy, Unknown, 5/7/17)





Vital Signs





Vital Signs








  Date Time  Temp Pulse Resp B/P (MAP) Pulse Ox O2 Delivery O2 Flow Rate FiO2


 


5/17/17 08:12  75  159/84    


 


5/17/17 05:59 98.1  18  95 Room Air  











Laboratory Data


CBC/BMP


Laboratory Tests


5/17/17 06:37








Red Blood Count 4.45, Mean Corpuscular Volume 89.0, Mean Corpuscular Hemoglobin 

31.0, Mean Corpuscular Hemoglobin Concent 34.8, Red Cell Distribution Width 13.5

, Calcium Level 7.7 L, Aspartate Amino Transf (AST/SGOT) 30, Alanine 

Aminotransferase (ALT/SGPT) 50, Alkaline Phosphatase 40 L, Total Bilirubin 0.6, 

Total Protein 6.1 L, Albumin 3.1 L


Labs 24H


Laboratory Tests 2


5/16/17 11:40: Bedside Glucose (Misc Panel) 130H


5/16/17 17:07: Bedside Glucose (Misc Panel) 170H


5/16/17 19:36: Bedside Glucose (Misc Panel) 157H


5/17/17 05:48: Bedside Glucose (Misc Panel) 175H


5/17/17 06:37: 


Anion Gap 8, Glomerular Filtration Rate > 60.0, Blood Urea Nitrogen 10, 

Creatinine 0.69L, Sodium Level 141, Potassium Level 3.6, Chloride Level 108H, 

Carbon Dioxide Level 25, Calcium Level 7.7L, Aspartate Amino Transf (AST/SGOT) 

30, Alanine Aminotransferase (ALT/SGPT) 50, Alkaline Phosphatase 40L, Total 

Bilirubin 0.6, Total Protein 6.1L, Albumin 3.1L, Albumin/Globulin Ratio 1.03





Current Medications


Current Medications





Current Medications


Aspirin (Ecotrin) 81 mg QAM PO  Last administered on 5/17/17at 08:11;  Start 5/ 12/17 at 09:00;  Stop 6/11/17 at 08:59


Atorvastatin Calcium (Lipitor) 40 mg QHS PO  Last administered on 5/16/17at 20:

05;  Start 5/11/17 at 21:00;  Stop 6/10/17 at 20:59


Carvedilol (COReg) 25 mg BID PO  Last administered on 5/17/17at 08:12;  Start 5/ 11/17 at 21:00;  Stop 6/10/17 at 20:59


Clopidogrel Bisulfate (PLAVix) 75 mg DAILY PO  Last administered on 5/17/17at 08

:11;  Start 5/12/17 at 09:00;  Stop 6/11/17 at 08:59


Dextrose (Dextrose 50%) 25 ml ASDIRECTED  PRN IV SEE LABEL COMMENTS;  Start 5/11 /17 at 16:45;  Stop 6/10/17 at 16:44


Enoxaparin Sodium (Lovenox) 30 mg BID SC  Last administered on 5/17/17at 08:12;

  Start 5/11/17 at 21:00;  Stop 5/20/17 at 20:59


Glucagon (Glucagon) 1 mg ASDIRECTED  PRN SC SEE LABEL COMMENTS;  Start 5/11/17 

at 16:45;  Stop 6/10/17 at 16:44


Glucose (Glucose) 16 GM ASDIRECTED  PRN PO SEE LABEL COMMENTS;  Start 5/11/17 

at 16:45;  Stop 6/10/17 at 16:44


Hydralazine HCl (Apresoline) 5 mg Q8H  PRN IV HYPERTENSION;  Start 5/11/17 at 17

:30;  Stop 6/10/17 at 17:29;  Status UNV


Hydralazine HCl (Apresoline) 10 mg Q8HP  PRN PO HTN;  Start 5/11/17 at 17:45;  

Stop 6/10/17 at 17:44


Insulin Human Lispro (HumaLOG INSULIN)  AC SC  Last administered on 5/12/17at 17

:02;  Start 5/11/17 at 17:30;  Stop 5/13/17 at 07:34;  Status DC


Insulin Human Lispro (HumaLOG INSULIN)  QHS SC ;  Start 5/11/17 at 21:00;  Stop 

5/13/17 at 07:34;  Status DC


Insulin Human Lispro (HumaLOG INSULIN) SEE PROTOCOL TABLE AC SC  Last 

administered on 5/17/17at 08:11;  Start 5/13/17 at 07:30;  Stop 6/12/17 at 07:29


Insulin Human Lispro (HumaLOG INSULIN) SEE PROTOCOL TABLE QHS SC ;  Start 5/13/ 17 at 21:00;  Stop 6/12/17 at 20:59


Lisinopril (Prinivil) 5 mg DAILY PO  Last administered on 5/17/17at 08:11;  

Start 5/13/17 at 09:00;  Stop 6/12/17 at 08:59


Magnesium Hydroxide (Milk Of Magnesia) 30 ml DAILYPRN  PRN PO CONSTIPATION;  

Start 5/11/17 at 15:30;  Stop 6/10/17 at 15:29


Nicotine (Nicoderm Cq 21mg) 1 patch DAILY TD  Last administered on 5/17/17at 08:

12;  Start 5/12/17 at 09:00;  Stop 6/11/17 at 08:59


Senna (Senokot) 1 tab QHS PO ;  Start 5/11/17 at 21:00;  Stop 6/10/17 at 20:59


Sodium Biphosphate/ Sodium Phosphate (Fleet Enema) 1 ea DAILYPRN  PRN [place] 

CONSTIPATION;  Start 5/11/17 at 15:30;  Stop 6/10/17 at 15:29











KHANH AMAYA MD May 17, 2017 11:17

## 2017-05-18 VITALS — DIASTOLIC BLOOD PRESSURE: 79 MMHG | SYSTOLIC BLOOD PRESSURE: 143 MMHG

## 2017-05-18 VITALS — SYSTOLIC BLOOD PRESSURE: 146 MMHG | DIASTOLIC BLOOD PRESSURE: 82 MMHG

## 2017-05-18 VITALS — SYSTOLIC BLOOD PRESSURE: 208 MMHG | DIASTOLIC BLOOD PRESSURE: 87 MMHG

## 2017-05-18 VITALS — DIASTOLIC BLOOD PRESSURE: 82 MMHG | SYSTOLIC BLOOD PRESSURE: 162 MMHG

## 2017-05-18 VITALS — DIASTOLIC BLOOD PRESSURE: 95 MMHG | SYSTOLIC BLOOD PRESSURE: 198 MMHG

## 2017-05-18 RX ADMIN — SENNOSIDES SCH TAB: 8.6 TABLET, FILM COATED ORAL at 21:00

## 2017-05-18 RX ADMIN — INSULIN LISPRO SCH UNITS: 100 INJECTION, SOLUTION INTRAVENOUS; SUBCUTANEOUS at 12:26

## 2017-05-18 RX ADMIN — NICOTINE SCH PATCH: 21 PATCH, EXTENDED RELEASE TRANSDERMAL at 08:15

## 2017-05-18 RX ADMIN — INSULIN LISPRO SCH UNITS: 100 INJECTION, SOLUTION INTRAVENOUS; SUBCUTANEOUS at 17:47

## 2017-05-18 RX ADMIN — ATORVASTATIN CALCIUM SCH MG: 20 TABLET, FILM COATED ORAL at 21:34

## 2017-05-18 RX ADMIN — ENOXAPARIN SODIUM SCH MG: 30 INJECTION SUBCUTANEOUS at 21:33

## 2017-05-18 RX ADMIN — CAPSAICIN SCH DOSE: 0.25 CREAM TOPICAL at 21:34

## 2017-05-18 RX ADMIN — INSULIN LISPRO SCH UNITS: 100 INJECTION, SOLUTION INTRAVENOUS; SUBCUTANEOUS at 21:00

## 2017-05-18 RX ADMIN — INSULIN LISPRO SCH UNITS: 100 INJECTION, SOLUTION INTRAVENOUS; SUBCUTANEOUS at 08:15

## 2017-05-18 RX ADMIN — ENOXAPARIN SODIUM SCH MG: 30 INJECTION SUBCUTANEOUS at 08:15

## 2017-05-18 RX ADMIN — CLOPIDOGREL BISULFATE SCH MG: 75 TABLET ORAL at 08:14

## 2017-05-18 RX ADMIN — LISINOPRIL SCH MG: 5 TABLET ORAL at 08:14

## 2017-05-18 RX ADMIN — HYDROCODONE BITARTRATE AND ACETAMINOPHEN PRN TAB: 5; 325 TABLET ORAL at 15:36

## 2017-05-18 RX ADMIN — ASPIRIN SCH MG: 81 TABLET ORAL at 08:14

## 2017-05-18 NOTE — IPNPDOC
Physiatrist Progress Note


DATE OF SERVICE:  5/18/17





DATE OF SERVICE:  5/18/17





DATE OF ADMISSION: May 11, 2017 at 16:25 


INPATIENT REHABILITATION ADMISSION DAY: #7





SUBJECTIVE: Patient is a 56-year-old white male with a pontine CVA. Patient 

reports feeling good having no pain or other complaints. He notes some 

improvement in sensation on his left upper and lower extremity as a pins and 

needles sensations have continue to decrease.





ALLERGIES: See Below





MEDICATIONS: Reviewed, see below.





OBJECTIVE:


VITAL SIGNS: Please see below.


PHYSICAL EXAMINATION:


GENERAL: Well-nourished well-developed middle-aged white male who is alert and 

oriented 4. Speech is clear coherent and appropriate. Affect is pleasant and 

cooperative the patient in good mood patient is in minimal to no acute distress.


HEENT: Normocephalic/atraumatic.


CARDIOVASCULAR: Regular rate and rhythm with normal S1-S2 in 2 out of 4 radial 

pulses.


LUNGS: All fields clear to auscultation without rales, rhonchi, or wheezing.


ABDOMEN: Benign with normal bowel sounds.


NEUROLOGICAL: As above with some continued mild weakness in the left upper and 

lower extremity and decreased coordination of 2 handed activities.


SKIN: Contusions of left knee continued to improve further.





LABORATORY DATA: Reviewed. Please see below.





MICROBIOLOGY: Please see below.





IMAGING: No new imaging.





DVT prophylaxis ordered?: Patient continues on Plavix and Lovenox with RACHEL 

hose. Patient also ambulating frequently for moderate distances.





ASSESSMENT AND PLAN:


1. Rehabilitation of right pontine CVA: Patient working hard in therapies and 

continue progress with PT and OT. Patient progressing and on track for 

discharge to home on May 24th with family. He is still having problems with 

left awareness, but now having knee pain returning to his arthritic knee as 

sensorium recovers. I will try Zostrix for this and avoid opiates that will 

decrease cognition needed for therapy.





2. Anemia: I will get new CBC tomorrow to  see if anemia and thrombocytopenia 

is changing.





3. Type 2 diabetes mellitus: Control remains good. I will discussed recommended 

home management with Medicine Service, but I will write for home Glucometer and 

testing supplies. Pt. to transition to oral hypoglycemic/Insulin resistance 

reducing treatment.





4. Hypertension: During her last 36 hours the patient has tended to run 

systolic blood pressures in the 160s. He may need some adjustment by Medicine 

Service.








TIME SPENT: Chart Review, examination and documentation greater than 35 minutes.


Allergies


Coded Allergies:  


     No Known Drug Allergy (Verified  Allergy, Unknown, 5/7/17)





Vital Signs





Vital Signs








  Date Time  Temp Pulse Resp B/P (MAP) Pulse Ox O2 Delivery O2 Flow Rate FiO2


 


5/18/17 16:20    143/79 (100)    


 


5/18/17 16:10   18     


 


5/18/17 14:30 98.1 82   97 Room Air  











Laboratory Data


Labs 24H


Laboratory Tests 2


5/17/17 17:29: Bedside Glucose (Misc Panel) 151H


5/17/17 20:54: Bedside Glucose (Misc Panel) 162H


5/18/17 06:54: Bedside Glucose (Misc Panel) 170H


5/18/17 11:22: Bedside Glucose (Misc Panel) 115H





Current Medications


Current Medications





Current Medications


Acetaminophen/ Hydrocodone Bitart (Norco, Anexsia 5/325) 1 tab Q6HP  PRN PO MILD

/MODERATE PAIN (PS 1-7) Last administered on 5/18/17at 15:36;  Start 5/17/17 at 

22:30;  Stop 5/24/17 at 22:29


Aspirin (Ecotrin) 81 mg QAM PO  Last administered on 5/18/17at 08:14;  Start 5/ 12/17 at 09:00;  Stop 6/11/17 at 08:59


Atorvastatin Calcium (Lipitor) 40 mg QHS PO  Last administered on 5/17/17at 20:

48;  Start 5/11/17 at 21:00;  Stop 6/10/17 at 20:59


Carvedilol (COReg) 25 mg BID PO  Last administered on 5/18/17at 08:14;  Start 5/ 11/17 at 21:00;  Stop 6/10/17 at 20:59


Clopidogrel Bisulfate (PLAVix) 75 mg DAILY PO  Last administered on 5/18/17at 08

:14;  Start 5/12/17 at 09:00;  Stop 6/11/17 at 08:59


Dextrose (Dextrose 50%) 25 ml ASDIRECTED  PRN IV SEE LABEL COMMENTS;  Start 5/11 /17 at 16:45;  Stop 6/10/17 at 16:44


Enoxaparin Sodium (Lovenox) 30 mg BID SC  Last administered on 5/18/17at 08:15;

  Start 5/11/17 at 21:00;  Stop 5/20/17 at 20:59


Glucagon (Glucagon) 1 mg ASDIRECTED  PRN SC SEE LABEL COMMENTS;  Start 5/11/17 

at 16:45;  Stop 6/10/17 at 16:44


Glucose (Glucose) 16 GM ASDIRECTED  PRN PO SEE LABEL COMMENTS;  Start 5/11/17 

at 16:45;  Stop 6/10/17 at 16:44


Hydralazine HCl (Apresoline) 5 mg Q8H  PRN IV HYPERTENSION;  Start 5/11/17 at 17

:30;  Stop 6/10/17 at 17:29;  Status UNV


Hydralazine HCl (Apresoline) 10 mg Q8HP  PRN PO HTN;  Start 5/11/17 at 17:45;  

Stop 6/10/17 at 17:44


Insulin Human Lispro (HumaLOG INSULIN)  AC SC  Last administered on 5/12/17at 17

:02;  Start 5/11/17 at 17:30;  Stop 5/13/17 at 07:34;  Status DC


Insulin Human Lispro (HumaLOG INSULIN)  QHS SC ;  Start 5/11/17 at 21:00;  Stop 

5/13/17 at 07:34;  Status DC


Insulin Human Lispro (HumaLOG INSULIN) SEE PROTOCOL TABLE AC SC  Last 

administered on 5/18/17at 12:26;  Start 5/13/17 at 07:30;  Stop 6/12/17 at 07:29


Insulin Human Lispro (HumaLOG INSULIN) SEE PROTOCOL TABLE QHS SC ;  Start 5/13/ 17 at 21:00;  Stop 6/12/17 at 20:59


Lisinopril (Prinivil) 5 mg DAILY PO  Last administered on 5/18/17at 08:14;  

Start 5/13/17 at 09:00;  Stop 6/12/17 at 08:59


Magnesium Hydroxide (Milk Of Magnesia) 30 ml DAILYPRN  PRN PO CONSTIPATION;  

Start 5/11/17 at 15:30;  Stop 6/10/17 at 15:29


Nicotine (Nicoderm Cq 21mg) 1 patch DAILY TD  Last administered on 5/18/17at 08:

15;  Start 5/12/17 at 09:00;  Stop 6/11/17 at 08:59


Senna (Senokot) 1 tab QHS PO ;  Start 5/11/17 at 21:00;  Stop 6/10/17 at 20:59


Sodium Biphosphate/ Sodium Phosphate (Fleet Enema) 1 ea DAILYPRN  PRN [place] 

CONSTIPATION;  Start 5/11/17 at 15:30;  Stop 6/10/17 at 15:29











KHANH AMAYA MD May 18, 2017 16:36
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
## 2017-05-18 NOTE — IPNPDOC
Subjective


Date Seen


The patient was seen on 5/18/17.





Subjective


Chief Complaint/HPI


The patient is a 56-year-old male admitted with a reason for visit of Stroke-

Right Carlos/Right Hemiparesis.


Events since last encounter


Pt with no complaints today.


ENT:  Denies: Head Aches, Dysphagia


Skin:  Denies: Rash, Lesions, Breakdown


Pulmonary:  Denies: Dyspnea, Cough


Cardiovascular:  Denies: Chest Pain, Palpitations, Orthopnea, Paroxysmal Noc. 

Dyspnea, Lt Headedness


Gastrointestinal:  Denies: Nausea, Vomiting, Abdominal Pain, Diarrhea, 

Constipation





Objective


Physical Examination


General Exam:  Positive: Alert


Eye Exam:  Positive: PERRLA


ENT Exam:  Positive: Atraumatic


Neck Exam:  Positive: Supple


Chest Exam:  Positive: Clear to auscultation, Normal air movement


Heart Exam:  Positive: Rate Normal, Regular Rhythm, Normal S1, Normal S2, 


   Negative: Murmurs, Rubs


Abdomen Exam:  Positive: Normal bowel sounds, Soft, 


   Negative: Tenderness, Hepatospenomegaly


Skin Exam:  Positive: Nl turgor and temperature





Assessment /Plan


Problems





(1) Stroke


Problem Text:  


* ARU as per Dr Perez 


* PT/OT


* ASA 81/Plavix/statin


* Outpt f/u with Neuro. 





(2) HTN (hypertension)


Problem Text:  


 * BP trend 150-160 systolic. 


 * Coreg


 * lisinopril- will increase to 10mg HS related to elevated BP. 


 * Hydralazine with hold paramters





(3) Diabetes


Problem Text:  


* diet


* SSI


* 115-170. 


* Pt is declining Metformin at this time. 








Plan/VTE


VTE Prophylaxis Ordered?:  Yes (Lovenox)





Disposition


as per ARU





VS, I&O, 24H, Raj


Vital Signs/I&O





Vital Signs








  Date Time  Temp Pulse Resp B/P (MAP) Pulse Ox O2 Delivery O2 Flow Rate FiO2


 


5/18/17 08:14    162/82    


 


5/18/17 08:14  74      


 


5/18/17 06:00 97.9  18  96 Room Air  














I&O- Last 24 Hours up to 6 AM 


 


 5/18/17





 05:59


 


Intake Total 1320 ml


 


Output Total 1975 ml


 


Balance -655 ml











Laboratory Data


24H LABS


Laboratory Tests 2


5/17/17 17:29: Bedside Glucose (Misc Panel) 151H


5/17/17 20:54: Bedside Glucose (Misc Panel) 162H


5/18/17 06:54: Bedside Glucose (Misc Panel) 170H


5/18/17 11:22: Bedside Glucose (Misc Panel) 115H











Lola Lundberg May 18, 2017 15:34

## 2017-05-19 VITALS — SYSTOLIC BLOOD PRESSURE: 158 MMHG | DIASTOLIC BLOOD PRESSURE: 80 MMHG

## 2017-05-19 VITALS — SYSTOLIC BLOOD PRESSURE: 162 MMHG | DIASTOLIC BLOOD PRESSURE: 80 MMHG

## 2017-05-19 VITALS — DIASTOLIC BLOOD PRESSURE: 75 MMHG | SYSTOLIC BLOOD PRESSURE: 132 MMHG

## 2017-05-19 RX ADMIN — ASPIRIN SCH MG: 81 TABLET ORAL at 08:55

## 2017-05-19 RX ADMIN — INSULIN LISPRO SCH UNITS: 100 INJECTION, SOLUTION INTRAVENOUS; SUBCUTANEOUS at 21:09

## 2017-05-19 RX ADMIN — ENOXAPARIN SODIUM SCH MG: 30 INJECTION SUBCUTANEOUS at 20:49

## 2017-05-19 RX ADMIN — ATORVASTATIN CALCIUM SCH MG: 20 TABLET, FILM COATED ORAL at 20:48

## 2017-05-19 RX ADMIN — INSULIN LISPRO SCH UNITS: 100 INJECTION, SOLUTION INTRAVENOUS; SUBCUTANEOUS at 08:55

## 2017-05-19 RX ADMIN — CAPSAICIN SCH DOSE: 0.25 CREAM TOPICAL at 09:00

## 2017-05-19 RX ADMIN — INSULIN LISPRO SCH UNITS: 100 INJECTION, SOLUTION INTRAVENOUS; SUBCUTANEOUS at 16:45

## 2017-05-19 RX ADMIN — SENNOSIDES SCH TAB: 8.6 TABLET, FILM COATED ORAL at 20:49

## 2017-05-19 RX ADMIN — NICOTINE SCH PATCH: 14 PATCH, EXTENDED RELEASE TRANSDERMAL at 16:36

## 2017-05-19 RX ADMIN — CLOPIDOGREL BISULFATE SCH MG: 75 TABLET ORAL at 08:59

## 2017-05-19 RX ADMIN — CAPSAICIN SCH DOSE: 0.25 CREAM TOPICAL at 20:49

## 2017-05-19 RX ADMIN — INSULIN LISPRO SCH UNITS: 100 INJECTION, SOLUTION INTRAVENOUS; SUBCUTANEOUS at 12:19

## 2017-05-19 RX ADMIN — CAPSAICIN SCH DOSE: 0.25 CREAM TOPICAL at 16:38

## 2017-05-19 RX ADMIN — ENOXAPARIN SODIUM SCH MG: 30 INJECTION SUBCUTANEOUS at 08:55

## 2017-05-19 RX ADMIN — NICOTINE SCH PATCH: 21 PATCH, EXTENDED RELEASE TRANSDERMAL at 08:53

## 2017-05-19 RX ADMIN — CAPSAICIN SCH DOSE: 0.25 CREAM TOPICAL at 13:00

## 2017-05-19 RX ADMIN — LISINOPRIL SCH MG: 5 TABLET ORAL at 08:57

## 2017-05-19 NOTE — IPNPDOC
Physiatrist Progress Note


DATE OF SERVICE:  5/19/17





DATE OF ADMISSION: May 11, 2017 at 16:25 


INPATIENT REHABILITATION ADMISSION DAY: #8 





SUBJECTIVE: Patient is a 56-year-old white male with a pontine CVA. Patient 

reports feeling good having no pain except left knee or other complaints. He 

notes some improvement in sensation on his left upper and lower extremity as a 

pins and needles sensations have continue to decrease.





ALLERGIES: See Below





MEDICATIONS: Reviewed, see below.





OBJECTIVE:


VITAL SIGNS: Please see below.


PHYSICAL EXAMINATION:


GENERAL: Well-nourished well-developed middle-aged white male who is alert and 

oriented 4. Speech is clear coherent and appropriate. Affect is pleasant and 

cooperative the patient in good mood patient is in minimal to no acute distress.


HEENT: Normocephalic/atraumatic.


CARDIOVASCULAR: Regular rate and rhythm with normal S1-S2 without S3. S4, 

murmurs or rubs and 2 out of 4 radial pulses.


LUNGS: All fields clear to auscultation without rales, rhonchi, or wheezing.


ABDOMEN: Benign with normal bowel sounds.


NEUROLOGICAL: As above with some continued mild weakness in the left upper and 

lower extremity and decreased coordination of 2 handed activities.


SKIN: Contusions of left knee continued to improve further.





LABORATORY DATA: Reviewed. Please see below.





MICROBIOLOGY: Please see below.





IMAGING: No new imaging.





DVT prophylaxis ordered?: Patient continues on Plavix and Lovenox with RACHEL 

hose. Patient also ambulating frequently for moderate distances.





ASSESSMENT AND PLAN:


1. Rehabilitation of right pontine CVA: Patient working hard in therapies and 

continue progress with PT and OT. Patient progressing and on track for 

discharge to home on May 24th with family. He is still having problems with 

left awareness. I will continue Zostrix for this and avoid opiates that will 

decrease cognition needed for therapy.





2. Anemia: I will recheck it on Monday.





3. Type 2 diabetes mellitus: Control remains good. I will discussed recommended 

home management with Medicine Service, but I will write for home Glucometer and 

testing supplies. Pt. to transition to oral hypoglycemic/Insulin resistance 

reducing treatment.





4. Hypertension: During her last 36 hours the patient has tended to run 

systolic blood pressures in the 150's and 160s. Medicine Service aware.








TIME SPENT: Chart Review, examination and documentation greater than 25 minutes.


Allergies


Coded Allergies:  


     No Known Drug Allergy (Verified  Allergy, Unknown, 5/7/17)





Vital Signs





Vital Signs








  Date Time  Temp Pulse Resp B/P (MAP) Pulse Ox O2 Delivery O2 Flow Rate FiO2


 


5/19/17 08:58  86      


 


5/19/17 08:57    154/86    


 


5/19/17 06:00 98.8  18  98 Room Air  











Laboratory Data


Labs 24H


Laboratory Tests 2


5/18/17 11:22: Bedside Glucose (Misc Panel) 115H


5/18/17 16:49: Bedside Glucose (Misc Panel) 131H


5/18/17 20:39: Bedside Glucose (Misc Panel) 170H


5/19/17 06:17: Bedside Glucose (Misc Panel) 219H





Current Medications


Current Medications





Current Medications


Acetaminophen/ Hydrocodone Bitart (Norco, Anexsia 5/325) 1 tab Q6HP  PRN PO MILD

/MODERATE PAIN (PS 1-7) Last administered on 5/18/17at 15:36;  Start 5/17/17 at 

22:30;  Stop 5/18/17 at 16:47;  Status DC


Aspirin (Ecotrin) 81 mg QAM PO  Last administered on 5/19/17at 08:55;  Start 5/ 12/17 at 09:00;  Stop 6/11/17 at 08:59


Atorvastatin Calcium (Lipitor) 40 mg QHS PO  Last administered on 5/18/17at 21:

34;  Start 5/11/17 at 21:00;  Stop 6/10/17 at 20:59


Capsaicin (Zostrix 0.025%) to anterior left k... TID TOP  Last administered on 5 /19/17at 09:00;  Start 5/18/17 at 21:00;  Stop 5/22/17 at 06:00


Carvedilol (COReg) 25 mg BID PO  Last administered on 5/19/17at 08:58;  Start 5/ 11/17 at 21:00;  Stop 6/10/17 at 20:59


Clopidogrel Bisulfate (PLAVix) 75 mg DAILY PO  Last administered on 5/19/17at 08

:59;  Start 5/12/17 at 09:00;  Stop 6/11/17 at 08:59


Dextrose (Dextrose 50%) 25 ml ASDIRECTED  PRN IV SEE LABEL COMMENTS;  Start 5/11 /17 at 16:45;  Stop 6/10/17 at 16:44


Enoxaparin Sodium (Lovenox) 30 mg BID SC  Last administered on 5/19/17at 08:55;

  Start 5/11/17 at 21:00;  Stop 5/20/17 at 20:59


Glucagon (Glucagon) 1 mg ASDIRECTED  PRN SC SEE LABEL COMMENTS;  Start 5/11/17 

at 16:45;  Stop 6/10/17 at 16:44


Glucose (Glucose) 16 GM ASDIRECTED  PRN PO SEE LABEL COMMENTS;  Start 5/11/17 

at 16:45;  Stop 6/10/17 at 16:44


Hydralazine HCl (Apresoline) 5 mg Q8H  PRN IV HYPERTENSION;  Start 5/11/17 at 17

:30;  Stop 6/10/17 at 17:29;  Status UNV


Hydralazine HCl (Apresoline) 10 mg Q8HP  PRN PO HTN;  Start 5/11/17 at 17:45;  

Stop 6/10/17 at 17:44


Insulin Human Lispro (HumaLOG INSULIN)  AC SC  Last administered on 5/12/17at 17

:02;  Start 5/11/17 at 17:30;  Stop 5/13/17 at 07:34;  Status DC


Insulin Human Lispro (HumaLOG INSULIN)  QHS SC ;  Start 5/11/17 at 21:00;  Stop 

5/13/17 at 07:34;  Status DC


Insulin Human Lispro (HumaLOG INSULIN) SEE PROTOCOL TABLE AC SC  Last 

administered on 5/19/17at 08:55;  Start 5/13/17 at 07:30;  Stop 6/12/17 at 07:29


Insulin Human Lispro (HumaLOG INSULIN) SEE PROTOCOL TABLE QHS SC ;  Start 5/13/ 17 at 21:00;  Stop 6/12/17 at 20:59


Lisinopril (Prinivil) 5 mg DAILY PO  Last administered on 5/19/17at 08:57;  

Start 5/13/17 at 09:00;  Stop 6/12/17 at 08:59


Magnesium Hydroxide (Milk Of Magnesia) 30 ml DAILYPRN  PRN PO CONSTIPATION;  

Start 5/11/17 at 15:30;  Stop 6/10/17 at 15:29


Nicotine (Nicoderm Cq 21mg) 1 patch DAILY TD  Last administered on 5/19/17at 08:

53;  Start 5/12/17 at 09:00;  Stop 6/11/17 at 08:59


Senna (Senokot) 1 tab QHS PO ;  Start 5/11/17 at 21:00;  Stop 6/10/17 at 20:59


Sodium Biphosphate/ Sodium Phosphate (Fleet Enema) 1 ea DAILYPRN  PRN [place] 

CONSTIPATION;  Start 5/11/17 at 15:30;  Stop 6/10/17 at 15:29











KHANH AMAYA MD May 19, 2017 10:07

## 2017-05-19 NOTE — IPNPDOC
Subjective


Date Seen


The patient was seen on 5/19/17.





Subjective


Chief Complaint/HPI


The patient is a 56-year-old male admitted with a reason for visit of Stroke-

Right Carlos/Right Hemiparesis.


Events since last encounter


No new complaints.


States he has had Left knee pain- which is chronic for him.





Objective


Physical Examination


General Exam:  Positive: Alert


Eye Exam:  Positive: PERRLA


ENT Exam:  Positive: Atraumatic


Neck Exam:  Positive: Supple


Chest Exam:  Positive: Clear to auscultation, Normal air movement


Heart Exam:  Positive: Rate Normal, Regular Rhythm, Normal S1, Normal S2, 


   Negative: Murmurs, Rubs


Abdomen Exam:  Positive: Normal bowel sounds, Soft, 


   Negative: Tenderness, Hepatospenomegaly


Skin Exam:  Positive: Nl turgor and temperature





Assessment /Plan


Problems





(1) Stroke


Problem Text:  


* ARU as per Dr Perez 


* PT/OT


* ASA 81/Plavix/statin


* Outpt f/u with Neuro. 





(2) HTN (hypertension)


Problem Text:  


 * BP trend 140s-150s systolic. 


 * Cont Coreg


 * lisinopril- 5/18 increased to 10mg HS related to elevated BP. 


 * Hydralazine with hold paramters





(3) Diabetes


Problem Text:  


* diet


* SSI


* 115-219. 


* Pt is declining Metformin at this time.


* Discussed with Pt today, he states he does not wish to be tried on po meds at 

this time while he is here. He wants to f/u with PCP after d/c home and discuss 

further. 


* Pt agreeable to continuing with SSI. 


* Oupt f/u with PCP.  








Plan/VTE


VTE Prophylaxis Ordered?:  Yes (Lovenox)





VS, I&O, 24H, Fishbone


Vital Signs/I&O





Vital Signs








  Date Time  Temp Pulse Resp B/P (MAP) Pulse Ox O2 Delivery O2 Flow Rate FiO2


 


5/19/17 08:58  86      


 


5/19/17 08:57    154/86    


 


5/19/17 06:00 98.8  18  98 Room Air  














I&O- Last 24 Hours up to 6 AM 


 


 5/19/17





 06:00


 


Intake Total 1080 ml


 


Output Total 325 ml


 


Balance 755 ml











Laboratory Data


24H LABS


Laboratory Tests 2


5/18/17 11:22: Bedside Glucose (Misc Panel) 115H


5/18/17 16:49: Bedside Glucose (Misc Panel) 131H


5/18/17 20:39: Bedside Glucose (Misc Panel) 170H


5/19/17 06:17: Bedside Glucose (Misc Panel) 219H











Lola Lundberg May 19, 2017 10:41

## 2017-05-20 VITALS — DIASTOLIC BLOOD PRESSURE: 77 MMHG | SYSTOLIC BLOOD PRESSURE: 148 MMHG

## 2017-05-20 VITALS — DIASTOLIC BLOOD PRESSURE: 76 MMHG | SYSTOLIC BLOOD PRESSURE: 162 MMHG

## 2017-05-20 VITALS — DIASTOLIC BLOOD PRESSURE: 78 MMHG | SYSTOLIC BLOOD PRESSURE: 152 MMHG

## 2017-05-20 LAB
ERYTHROCYTE [DISTWIDTH] IN BLOOD BY AUTOMATED COUNT: 13.7 % (ref 11.5–14.5)
MCH RBC QN AUTO: 30.7 PG (ref 27–33)
MCHC RBC AUTO-ENTMCNC: 34.2 G/DL (ref 32–36.5)
MCV RBC AUTO: 89.8 FL (ref 80–96)
PLATELET # BLD AUTO: (no result) K/MM3 (ref 150–450)
WBC # BLD AUTO: 7.8 K/MM3 (ref 4–10)

## 2017-05-20 RX ADMIN — NICOTINE SCH PATCH: 14 PATCH, EXTENDED RELEASE TRANSDERMAL at 09:11

## 2017-05-20 RX ADMIN — LISINOPRIL SCH MG: 5 TABLET ORAL at 09:12

## 2017-05-20 RX ADMIN — CAPSAICIN SCH DOSE: 0.25 CREAM TOPICAL at 09:13

## 2017-05-20 RX ADMIN — SENNOSIDES SCH TAB: 8.6 TABLET, FILM COATED ORAL at 20:36

## 2017-05-20 RX ADMIN — ENOXAPARIN SODIUM SCH MG: 30 INJECTION SUBCUTANEOUS at 09:12

## 2017-05-20 RX ADMIN — INSULIN LISPRO SCH UNITS: 100 INJECTION, SOLUTION INTRAVENOUS; SUBCUTANEOUS at 21:00

## 2017-05-20 RX ADMIN — CAPSAICIN SCH DOSE: 0.25 CREAM TOPICAL at 12:15

## 2017-05-20 RX ADMIN — CLOPIDOGREL BISULFATE SCH MG: 75 TABLET ORAL at 09:11

## 2017-05-20 RX ADMIN — ASPIRIN SCH MG: 81 TABLET ORAL at 09:11

## 2017-05-20 RX ADMIN — INSULIN LISPRO SCH UNITS: 100 INJECTION, SOLUTION INTRAVENOUS; SUBCUTANEOUS at 12:14

## 2017-05-20 RX ADMIN — INSULIN LISPRO SCH UNITS: 100 INJECTION, SOLUTION INTRAVENOUS; SUBCUTANEOUS at 09:12

## 2017-05-20 RX ADMIN — ENOXAPARIN SODIUM SCH MG: 30 INJECTION SUBCUTANEOUS at 20:36

## 2017-05-20 RX ADMIN — INSULIN LISPRO SCH UNITS: 100 INJECTION, SOLUTION INTRAVENOUS; SUBCUTANEOUS at 17:46

## 2017-05-20 RX ADMIN — CAPSAICIN SCH DOSE: 0.25 CREAM TOPICAL at 17:00

## 2017-05-20 RX ADMIN — ATORVASTATIN CALCIUM SCH MG: 20 TABLET, FILM COATED ORAL at 20:35

## 2017-05-20 RX ADMIN — CAPSAICIN SCH DOSE: 0.25 CREAM TOPICAL at 20:36

## 2017-05-21 VITALS — DIASTOLIC BLOOD PRESSURE: 86 MMHG | SYSTOLIC BLOOD PRESSURE: 165 MMHG

## 2017-05-21 VITALS — DIASTOLIC BLOOD PRESSURE: 86 MMHG | SYSTOLIC BLOOD PRESSURE: 158 MMHG

## 2017-05-21 VITALS — DIASTOLIC BLOOD PRESSURE: 82 MMHG | SYSTOLIC BLOOD PRESSURE: 164 MMHG

## 2017-05-21 RX ADMIN — CAPSAICIN SCH DOSE: 0.25 CREAM TOPICAL at 13:00

## 2017-05-21 RX ADMIN — CAPSAICIN SCH DOSE: 0.25 CREAM TOPICAL at 17:00

## 2017-05-21 RX ADMIN — CAPSAICIN SCH DOSE: 0.25 CREAM TOPICAL at 21:00

## 2017-05-21 RX ADMIN — INSULIN LISPRO SCH UNITS: 100 INJECTION, SOLUTION INTRAVENOUS; SUBCUTANEOUS at 13:06

## 2017-05-21 RX ADMIN — INSULIN LISPRO SCH UNITS: 100 INJECTION, SOLUTION INTRAVENOUS; SUBCUTANEOUS at 21:00

## 2017-05-21 RX ADMIN — ENOXAPARIN SODIUM SCH MG: 30 INJECTION SUBCUTANEOUS at 21:45

## 2017-05-21 RX ADMIN — CAPSAICIN SCH DOSE: 0.25 CREAM TOPICAL at 10:14

## 2017-05-21 RX ADMIN — ENOXAPARIN SODIUM SCH MG: 30 INJECTION SUBCUTANEOUS at 10:15

## 2017-05-21 RX ADMIN — ATORVASTATIN CALCIUM SCH MG: 20 TABLET, FILM COATED ORAL at 21:45

## 2017-05-21 RX ADMIN — INSULIN LISPRO SCH UNITS: 100 INJECTION, SOLUTION INTRAVENOUS; SUBCUTANEOUS at 07:55

## 2017-05-21 RX ADMIN — ASPIRIN SCH MG: 81 TABLET ORAL at 10:14

## 2017-05-21 RX ADMIN — SENNOSIDES SCH TAB: 8.6 TABLET, FILM COATED ORAL at 21:00

## 2017-05-21 RX ADMIN — INSULIN LISPRO SCH UNITS: 100 INJECTION, SOLUTION INTRAVENOUS; SUBCUTANEOUS at 17:21

## 2017-05-21 RX ADMIN — LISINOPRIL SCH MG: 5 TABLET ORAL at 10:15

## 2017-05-21 RX ADMIN — NICOTINE SCH PATCH: 14 PATCH, EXTENDED RELEASE TRANSDERMAL at 10:16

## 2017-05-21 RX ADMIN — CLOPIDOGREL BISULFATE SCH MG: 75 TABLET ORAL at 10:14

## 2017-05-22 VITALS — SYSTOLIC BLOOD PRESSURE: 158 MMHG | DIASTOLIC BLOOD PRESSURE: 84 MMHG

## 2017-05-22 VITALS — DIASTOLIC BLOOD PRESSURE: 80 MMHG | SYSTOLIC BLOOD PRESSURE: 170 MMHG

## 2017-05-22 VITALS — DIASTOLIC BLOOD PRESSURE: 86 MMHG | SYSTOLIC BLOOD PRESSURE: 160 MMHG

## 2017-05-22 LAB
ERYTHROCYTE [DISTWIDTH] IN BLOOD BY AUTOMATED COUNT: 13.9 % (ref 11.5–14.5)
MCH RBC QN AUTO: 31.3 PG (ref 27–33)
MCHC RBC AUTO-ENTMCNC: 34.8 G/DL (ref 32–36.5)
MCV RBC AUTO: 90 FL (ref 80–96)
PLATELET # BLD AUTO: 116 K/MM3 (ref 150–450)
WBC # BLD AUTO: 7.3 K/MM3 (ref 4–10)

## 2017-05-22 RX ADMIN — NICOTINE SCH PATCH: 14 PATCH, EXTENDED RELEASE TRANSDERMAL at 09:57

## 2017-05-22 RX ADMIN — INSULIN LISPRO SCH UNITS: 100 INJECTION, SOLUTION INTRAVENOUS; SUBCUTANEOUS at 07:36

## 2017-05-22 RX ADMIN — CLOPIDOGREL BISULFATE SCH MG: 75 TABLET ORAL at 10:01

## 2017-05-22 RX ADMIN — CAPSAICIN SCH DOSE: 0.25 CREAM TOPICAL at 10:03

## 2017-05-22 RX ADMIN — INSULIN LISPRO SCH UNITS: 100 INJECTION, SOLUTION INTRAVENOUS; SUBCUTANEOUS at 20:16

## 2017-05-22 RX ADMIN — CAPSAICIN SCH DOSE: 0.25 CREAM TOPICAL at 17:00

## 2017-05-22 RX ADMIN — ATORVASTATIN CALCIUM SCH MG: 20 TABLET, FILM COATED ORAL at 20:16

## 2017-05-22 RX ADMIN — LISINOPRIL SCH MG: 5 TABLET ORAL at 10:02

## 2017-05-22 RX ADMIN — ASPIRIN SCH MG: 81 TABLET ORAL at 09:58

## 2017-05-22 RX ADMIN — ENOXAPARIN SODIUM SCH MG: 30 INJECTION SUBCUTANEOUS at 09:58

## 2017-05-22 RX ADMIN — INSULIN LISPRO SCH UNITS: 100 INJECTION, SOLUTION INTRAVENOUS; SUBCUTANEOUS at 13:01

## 2017-05-22 RX ADMIN — INSULIN LISPRO SCH UNITS: 100 INJECTION, SOLUTION INTRAVENOUS; SUBCUTANEOUS at 17:27

## 2017-05-22 RX ADMIN — SENNOSIDES SCH TAB: 8.6 TABLET, FILM COATED ORAL at 20:16

## 2017-05-22 RX ADMIN — CAPSAICIN SCH DOSE: 0.25 CREAM TOPICAL at 20:17

## 2017-05-22 RX ADMIN — CAPSAICIN SCH DOSE: 0.25 CREAM TOPICAL at 13:00

## 2017-05-22 NOTE — IPNPDOC
Physiatrist Progress Note


DATE OF SERVICE:  5/22/17





DATE OF ADMISSION: May 11, 2017 at 16:25 


INPATIENT REHABILITATION ADMISSION DAY: #11





SUBJECTIVE: Patient is a 56-year-old white male with pontine CVA with left 

hemiparesis and decreased left sensorium/neglect. Patient doing fairly well 

becoming more aware of his left upper and lower extremity. The still complains 

about his knee so they aren't hurting as much as they were couple days ago when 

the storms first were moving in. Patient reports moderate knee pain otherwise 

no problems.





ALLERGIES: See Below





MEDICATIONS: Reviewed, see below.





OBJECTIVE:


VITAL SIGNS: Please see below.


PHYSICAL EXAMINATION:


GENERAL: Well-nourished well-developed middle-aged white male who is alert and 

oriented 4 and appears to be in very mild musculoskeletal distress with good 

function on the needs in spite of arthritis and prior surgery.


HEENT: Normocephalic/atraumatic with no facial droop.


CARDIOVASCULAR: Regular rate and rhythm with normal S1-S2. 2/4 bilateral radial 

pulses.


LUNGS: All fields clear to auscultation.


ABDOMEN: Benign with normal bowel sounds.


NEUROLOGICAL: Good strength in the left upper and lower extremity, and full on 

the right. Stability improving.


SKIN: Left knee abrasion essentially gone and contusions almost clear.





LABORATORY DATA: Reviewed. Please see below.





MICROBIOLOGY: Please see below.





IMAGING: No new imaging.





DVT prophylaxis ordered?: Patient continues on Lovenox and using RACHEL hose.





ASSESSMENT AND PLAN:


1. Rehabilitation of right pontine CVA: Patient doing well in therapy and this 

morning PT and OT have recommended patient is right for room privileges. 

Patient now walking >200 yard and >=18 stairs with continued progression in 

level of independence in PT/OT, so will look to discharge to home with family 

tomorrow with [facility] and Shower Chair. 





2. DJD of knees: While patient does have reason to have some ache do not think 

his pain is truly 5/10 and most definitely is not appropriate for opiates. I 

will continue with the capsaicin for now.





3. DVT prophylaxis: Patient doing well and his ambulation probably mitigates 

needing to continue the Lovenox for this reason. I will D/C Lovenox in light of 

patient's daily walking.





4. Type 2 DM: Will proceed with home insulin monitoring and insulin sliding 

scale as patient refusing any oral diabetic agents, and has done well on ISS 

here.





TIME SPENT: Chart Review, examination and documentation greater than 35 minutes.


Allergies


Coded Allergies:  


     No Known Drug Allergy (Verified  Allergy, Unknown, 5/7/17)





Vital Signs





Vital Signs








  Date Time  Temp Pulse Resp B/P (MAP) Pulse Ox O2 Delivery O2 Flow Rate FiO2


 


5/22/17 10:00  64  142/82    


 


5/22/17 06:00 97.0  18  96 Room Air  











Laboratory Data


CBC/BMP


Laboratory Tests


5/22/17 06:25








Red Blood Count 4.24 L, Mean Corpuscular Volume 90.0, Mean Corpuscular 

Hemoglobin 31.3, Mean Corpuscular Hemoglobin Concent 34.8, Red Cell 

Distribution Width 13.9


Labs 24H


Laboratory Tests 2


5/21/17 11:30: Bedside Glucose (Misc Panel) 129H


5/21/17 16:31: Bedside Glucose (Misc Panel) 199H


5/21/17 20:43: Bedside Glucose (Misc Panel) 168H


5/22/17 06:27: Bedside Glucose (Misc Panel) 175H





Current Medications


Current Medications





Current Medications


Acetaminophen/ Hydrocodone Bitart (Norco, Anexsia 5/325) 1 tab Q6HP  PRN PO MILD

/MODERATE PAIN (PS 1-7) Last administered on 5/18/17at 15:36;  Start 5/17/17 at 

22:30;  Stop 5/18/17 at 16:47;  Status DC


Aspirin (Ecotrin) 81 mg QAM PO  Last administered on 5/22/17at 09:58;  Start 5/ 12/17 at 09:00;  Stop 6/11/17 at 08:59


Atorvastatin Calcium (Lipitor) 40 mg QHS PO  Last administered on 5/21/17at 21:

45;  Start 5/11/17 at 21:00;  Stop 6/10/17 at 20:59


Capsaicin (Zostrix 0.025%) to anterior left k... TID TOP  Last administered on 5 /19/17at 09:00;  Start 5/18/17 at 21:00;  Stop 5/19/17 at 15:04;  Status DC


Capsaicin (Zostrix 0.025%) to bilateral anter... QID TOP  Last administered on 5 /22/17at 10:03;  Start 5/19/17 at 13:00;  Stop 5/26/17 at 12:00


Carvedilol (COReg) 25 mg BID PO  Last administered on 5/22/17at 10:00;  Start 5/ 11/17 at 21:00;  Stop 6/10/17 at 20:59


Clopidogrel Bisulfate (PLAVix) 75 mg DAILY PO  Last administered on 5/22/17at 10

:01;  Start 5/12/17 at 09:00;  Stop 6/11/17 at 08:59


Dextrose (Dextrose 50%) 25 ml ASDIRECTED  PRN IV SEE LABEL COMMENTS;  Start 5/11 /17 at 16:45;  Stop 6/10/17 at 16:44


Enoxaparin Sodium (Lovenox) 30 mg BID SC  Last administered on 5/22/17at 09:58;

  Start 5/11/17 at 21:00;  Stop 5/26/17 at 12:00


Glucagon (Glucagon) 1 mg ASDIRECTED  PRN SC SEE LABEL COMMENTS;  Start 5/11/17 

at 16:45;  Stop 6/10/17 at 16:44


Glucose (Glucose) 16 GM ASDIRECTED  PRN PO SEE LABEL COMMENTS;  Start 5/11/17 

at 16:45;  Stop 6/10/17 at 16:44


Hydralazine HCl (Apresoline) 5 mg Q8H  PRN IV HYPERTENSION;  Start 5/11/17 at 17

:30;  Stop 6/10/17 at 17:29;  Status UNV


Hydralazine HCl (Apresoline) 10 mg Q8HP  PRN PO HTN;  Start 5/11/17 at 17:45;  

Stop 6/10/17 at 17:44


Insulin Human Lispro (HumaLOG INSULIN)  AC SC  Last administered on 5/12/17at 17

:02;  Start 5/11/17 at 17:30;  Stop 5/13/17 at 07:34;  Status DC


Insulin Human Lispro (HumaLOG INSULIN)  QHS SC ;  Start 5/11/17 at 21:00;  Stop 

5/13/17 at 07:34;  Status DC


Insulin Human Lispro (HumaLOG INSULIN) SEE PROTOCOL TABLE AC SC  Last 

administered on 5/22/17at 07:36;  Start 5/13/17 at 07:30;  Stop 6/12/17 at 07:29


Insulin Human Lispro (HumaLOG INSULIN) SEE PROTOCOL TABLE QHS SC ;  Start 5/13/ 17 at 21:00;  Stop 6/12/17 at 20:59


Lisinopril (Prinivil) 5 mg DAILY PO  Last administered on 5/22/17at 10:02;  

Start 5/13/17 at 09:00;  Stop 6/12/17 at 08:59


Magnesium Hydroxide (Milk Of Magnesia) 30 ml DAILYPRN  PRN PO CONSTIPATION;  

Start 5/11/17 at 15:30;  Stop 6/10/17 at 15:29


Nicotine (Nicoderm Cq 14mg) 1 patch DAILY TD  Last administered on 5/22/17at 09:

57;  Start 5/19/17 at 09:00;  Stop 6/18/17 at 08:59


Nicotine (Nicoderm Cq 21mg) 1 patch DAILY TD  Last administered on 5/19/17at 08:

53;  Start 5/12/17 at 09:00;  Stop 5/19/17 at 15:04;  Status DC


Senna (Senokot) 1 tab QHS PO ;  Start 5/11/17 at 21:00;  Stop 6/10/17 at 20:59


Sodium Biphosphate/ Sodium Phosphate (Fleet Enema) 1 ea DAILYPRN  PRN [place] 

CONSTIPATION;  Start 5/11/17 at 15:30;  Stop 6/10/17 at 15:29


Trazodone HCl (Desyrel) 100 mg QHSP  PRN PO INSOMNIA Last administered on 5/19/ 17at 20:47;  Start 5/19/17 at 15:00;  Stop 6/18/17 at 14:59











KHANH AMAYA MD May 22, 2017 10:16

## 2017-05-23 VITALS — DIASTOLIC BLOOD PRESSURE: 83 MMHG | SYSTOLIC BLOOD PRESSURE: 139 MMHG

## 2017-05-23 LAB
ERYTHROCYTE [DISTWIDTH] IN BLOOD BY AUTOMATED COUNT: 13.9 % (ref 11.5–14.5)
MCH RBC QN AUTO: 30 PG (ref 27–33)
MCHC RBC AUTO-ENTMCNC: 33.4 G/DL (ref 32–36.5)
MCV RBC AUTO: 89.9 FL (ref 80–96)
PLATELET # BLD AUTO: 138 K/MM3 (ref 150–450)
WBC # BLD AUTO: 7.5 K/MM3 (ref 4–10)

## 2017-05-23 RX ADMIN — INSULIN LISPRO SCH UNITS: 100 INJECTION, SOLUTION INTRAVENOUS; SUBCUTANEOUS at 12:22

## 2017-05-23 RX ADMIN — INSULIN LISPRO SCH UNITS: 100 INJECTION, SOLUTION INTRAVENOUS; SUBCUTANEOUS at 08:20

## 2017-05-23 RX ADMIN — ASPIRIN SCH MG: 81 TABLET ORAL at 08:20

## 2017-05-23 RX ADMIN — CLOPIDOGREL BISULFATE SCH MG: 75 TABLET ORAL at 08:20

## 2017-05-23 RX ADMIN — LISINOPRIL SCH MG: 5 TABLET ORAL at 08:20

## 2017-05-23 NOTE — PMRDS
DATE OF ADMISSION:  05/11/2017

DATE OF DISCHARGE:  05/23/2017

 

DISCHARGE DIAGNOSIS:  Rehabilitation of right pontine CVA with left hemiparesis,

left decreased sensorium, and incoordination and balance deficits.

 

SECONDARY DISCHARGE DIAGNOSES:

1.  Type 2 diabetes mellitus, new onset.

2.  Atherosclerotic cardiovascular disease including hypertension and

hyperlipidemia.

3.  Tobacco dependence.

 

Admitted on 05/07/2017 to [facility] for new onset left-sided

weakness.  Patient found to have sustained a right pontine CVA.  He was further

evaluated and started in physical and occupational therapy and was noted to also

have newly discovered type 2 diabetes mellitus.  He was medically stabilized and

on 05/11/2017, patient was admitted to the acute rehabilitation service for a

course of neurorehabilitation.

 

PROCEDURES PERFORMED ON THIS UNIT:  None.

 

DIAGNOSTIC AND LABORATORY DATA:

Showed very mild anemia with hemoglobin ranging from 12.9 up to 13.8 and low

platelet count starting at 91 and increasing to 138 by the time of discharge,

also very mild hypoalbuminemia of 3.1.

 

HOSPITAL COURSE:  Patient was admitted on 05/11/2017, to the acute 
rehabilitation

unit and was started on a program of physical and occupational therapy. The

patient participated well in these therapies and made significant gains, though

he showed a little bit of impulsivity early on and this caused a loss of balance

with a very mild abrasion of the left shin around the tibial tuberosity and a

very small, about 1 cm in diameter, contusion.  Also the next day he bumped the

left knee against the table and sustained a very small contusion at that time.

Otherwise, patient has been making steady progress.  He has progressed to

reaching his discharge goals to return to home with his wife using large base

quad cane for mobility.



 

DISCHARGE MEDICATIONS:

- atorvastatin 40 mg nightly

- Coreg 25 mg twice a day for hypertension

- Plavix 75 mg daily for clot prevention and CVA prevention

- lisinopril 5 mg daily for hypertension control

- metformin 500 mg twice a day for type 2 diabetes management

- trazodone 100 mg nightly as needed for insomnia

- aspirin 81 mg daily for clot prevention and deep venous thrombosis (DVT)

prevention

- Benadryl 50 mg every 4 hours as needed for allergies

 

The patient has also been given a prescription if he will continue to not smoke

to use Nicoderm 7 mg per 24 hour patch daily for the next 7 days and then

discontinue use.

 

He is on a consistent carbohydrate diet.

 

COMPLICATIONS:  Only the very mild abrasion.

 

DISCHARGE PLAN AND INSTRUCTIONS:  Patient is to see his primary care within 2

weeks at the [facility] and to see Dr. Dunn for neurology

followup in the next 2 weeks.

 

Time spent on discharge is greater than 35 minutes.

GWENDOLYN

## 2017-09-20 ENCOUNTER — HOSPITAL ENCOUNTER (OUTPATIENT)
Dept: HOSPITAL 53 - M SMT | Age: 57
End: 2017-09-20
Attending: INTERNAL MEDICINE

## 2017-09-20 DIAGNOSIS — Z02.9: Primary | ICD-10-CM

## 2017-09-20 NOTE — REP
Left knee series:  Five views.

 

History:  Degenerative disease.

 

Findings:  On the AP radiograph there is minimal medial osteophytic lipping at

the tibial plateau.  Bones, joints, and soft tissues are otherwise unremarkable.

A normal fabella is seen posterolaterally.

 

Impression:

 

Minimal osteoarthritic lipping medial tibial plateau.  Otherwise normal.

 

 

Signed by

Major Mancia MD 09/20/2017 06:38 P

## 2017-12-01 ENCOUNTER — HOSPITAL ENCOUNTER (OUTPATIENT)
Dept: HOSPITAL 53 - M SDC | Age: 57
Discharge: HOME | End: 2017-12-01
Attending: DENTIST
Payer: COMMERCIAL

## 2017-12-01 VITALS — BODY MASS INDEX: 34.02 KG/M2 | HEIGHT: 74 IN | WEIGHT: 265.1 LBS

## 2017-12-01 VITALS — SYSTOLIC BLOOD PRESSURE: 186 MMHG | DIASTOLIC BLOOD PRESSURE: 92 MMHG

## 2017-12-01 DIAGNOSIS — F17.210: ICD-10-CM

## 2017-12-01 DIAGNOSIS — E11.9: ICD-10-CM

## 2017-12-01 DIAGNOSIS — Z79.82: ICD-10-CM

## 2017-12-01 DIAGNOSIS — Z79.899: ICD-10-CM

## 2017-12-01 DIAGNOSIS — K02.9: Primary | ICD-10-CM

## 2017-12-01 DIAGNOSIS — I10: ICD-10-CM

## 2017-12-01 DIAGNOSIS — K05.6: ICD-10-CM

## 2017-12-01 PROCEDURE — 88300 SURGICAL PATH GROSS: CPT

## 2017-12-01 RX ADMIN — FENTANYL CITRATE PRN MCG: 50 INJECTION, SOLUTION INTRAMUSCULAR; INTRAVENOUS at 13:25

## 2017-12-01 RX ADMIN — FENTANYL CITRATE PRN MCG: 50 INJECTION, SOLUTION INTRAMUSCULAR; INTRAVENOUS at 13:20

## 2017-12-02 NOTE — RO
DATE OF PROCEDURE:   12/01/2017

 

PREPROCEDURE DIAGNOSIS:  Caries and periodontal disease, teeth numbers 2 through

15 and tooth number 19.

 

POSTPROCEDURE DIAGNOSIS:  Caries and periodontal disease, teeth numbers 2 through

15 and tooth number 19.

 

PROCEDURE:

1. Surgical removal of teeth as listed 2, 3, 4, 5, 6, 7, 8, 9, 10, 11, 12, 13,

14, 15 and 19.

2. Bilateral maxillary alveoplasty.

 

SURGEON:  Dr. Rancho Sabillon

 

ASSISTANT:

 

ANESTHESIA:  General nasal endotracheal.

 

INDICATIONS:  The patient is a 57-year-old male who presents for referral from

his general dentist for removal of all of his remaining upper teeth in addition

to one lower tooth in preparation for full upper denture. Due to patient's

medical history, including hypertension, CVA in the past in 2007, diabetes and

arthritis, on multiple medications, and the extent of the procedure, felt

necessary to be performed in the operating room under general anesthesia.

 

DESCRIPTION OF PROCEDURE:  The patient was brought to the operating room (OR) per

anesthesia, placed supine upon the OR table wherein general nasal endotracheal

anesthetic was undertaken without difficulty. After the usual sterile prep and

drape for an intraoral procedure was performed, a throat pack was placed. 2%

Xylocaine with 1:100,000 epinephrine was injected by way of infiltration fashion

along the surgical sites, approximately 6 mL.

 

Attention first turned to tooth number 19. A #15 scalpel blade was used to make a

full thickness mucoperiosteal incision, carried from the left external oblique

ridge forward to the area of edentulous 20. Full thickness flap was then raised

buccally with the periosteal elevator exposing the buccal bone support. A Carrizales

drill and copious sterile saline irrigation was then used to make a buccal bone

trench. The tooth was elevated. The crown and mesial root were delivered, distal

root apical third fractured and was removed after removal of interradicular bone

with the elevator technique. The bone was then smoothed, the socket curetted,

irrigated and then closed with #3-0 gut interrupted sutures for hemostasis.

 

Attention was then turned to the maxilla. The left maxillary quadrant was

performed fist. A #15 scalpel blade was used to make a full thickness

mucoperiosteal incision, carried from left maxillary tuberosity forward to the

midline in the buccal sulcular gingiva. Full thickness flap was then raised with

a periosteal elevator, exposing all the buccal bone support overlying the teeth.

Carrizales drill and copious sterile saline was used to make buccal bone trenches along

all the remaining upper teeth due to a heavy amount of bone and some exostoses

were noted. Once access was obtained, the teeth were then elevated and delivered.

The bone support was heavy and some of the molar tooth roots and bicuspid roots

were dilacerated leading to some difficulty upon removal. Once the teeth were

removed, the sockets were lightly curetted, buccal alveoplasty was performed as

necessary, smoothed with a bone file, irrigated copiously and then closed with a

#3-0 gut continuous interlocking suture for hemostasis.

 

Attention was then turned to the right maxilla. This side was performed in

exactly the same fashion after laying a full thickness flap and exposing the

buccal bone. Buccal bone trenches made along the teeth from tooth number 1 to the

midline of tooth number 8 and this buccal bone was necessary for removal for

access and to mobilize the teeth against dilacerated heavy buccal bone roots and

dilacerated tooth roots were noted, making the difficult extractions. Teeth once

removed, the sockets were curetted lightly, bone was smoothed, buccal alveoplasty

performed as necessary and then closed with a #3-0 gut continuous interlocking

suture for hemostasis.

 

At the termination of the procedure, the oropharynx was inspected and found to be

free of debris. There was no active heme. The throat pack was removed, and the

patient was awakened per anesthesia.

 

The estimated blood loss was approximately 50 mL.

 

Fluids of 1100 mL in crystalloid solution.

 

Needle and sponge count was correct.

 

The teeth were sent for identification only to pathology.

 

DISPOSITION:  The patient was extubated in the operating room, taken to the

recovery room breathing spontaneously in stable condition.

## 2018-09-06 ENCOUNTER — HOSPITAL ENCOUNTER (OUTPATIENT)
Dept: HOSPITAL 53 - M PAIN | Age: 58
End: 2018-09-06
Attending: NURSE PRACTITIONER
Payer: COMMERCIAL

## 2018-09-06 DIAGNOSIS — E78.5: ICD-10-CM

## 2018-09-06 DIAGNOSIS — Z86.73: ICD-10-CM

## 2018-09-06 DIAGNOSIS — Z79.84: ICD-10-CM

## 2018-09-06 DIAGNOSIS — F17.200: ICD-10-CM

## 2018-09-06 DIAGNOSIS — E11.9: ICD-10-CM

## 2018-09-06 DIAGNOSIS — Z79.82: ICD-10-CM

## 2018-09-06 DIAGNOSIS — M25.512: Primary | ICD-10-CM

## 2018-09-06 DIAGNOSIS — Z88.8: ICD-10-CM

## 2018-09-06 DIAGNOSIS — Z79.899: ICD-10-CM

## 2018-09-06 DIAGNOSIS — I10: ICD-10-CM
